# Patient Record
Sex: FEMALE | Race: AMERICAN INDIAN OR ALASKA NATIVE | NOT HISPANIC OR LATINO | Employment: FULL TIME | URBAN - METROPOLITAN AREA
[De-identification: names, ages, dates, MRNs, and addresses within clinical notes are randomized per-mention and may not be internally consistent; named-entity substitution may affect disease eponyms.]

---

## 2020-10-08 ENCOUNTER — TELEPHONE (OUTPATIENT)
Dept: TRANSPLANT | Facility: CLINIC | Age: 30
End: 2020-10-08

## 2020-10-08 NOTE — TELEPHONE ENCOUNTER
"Close Window   Print This Page   Expand All  Collapse All  [facility] BREEZE  [id number]      LIVING KIDNEY DONOR EVALUATION  Donor First Name Mahi Donor MRN    Donor Last Name Valente Completed 10/5/2020 3:38 PM    1990 Record ID [id number]   BREEZE Screen PASSED     Intended Recipient  Recipient First Name Lasha Recipient MRN    Recipient Last Name Johnny Relationship Spouse   Recipient  [id number] Recipient Diagnosis    Recipient's ABO      Donor Information  Age 30 Gender Female   Ht 163 cm (5' 4'') Race  or    Wt 85.7 kg (189 lbs) Ethnicity Not /   BMI 32.40 kg/m  Preferred Language English      Required No     Blood Type Unknown   Demographics  Home Address [address] [place] [address] # 2 [phone]   [facility] Type Mobile   State [place] Alternate #    Zip Code [address] Type Mobile   Country [place] Preferred Contact day Mon, Fri, Thur, Wed, Tue   Email [email or web address] Preferred Contact time 11:00 AM-1:00 PM, 1:00 PM-4:00 PM, 09:00 AM-11:00 AM   &&   Donor's Medical Information  Medical History None Reported Medications None Reported   Surgical History Tubal Ligation Allergies NKDA   Social History EtOH: Rare (1-2 drinks/month)   Illicit Drug Use: Denies   Tobacco: Denies Self-Reported Functional Status \"I am able to participate in strenuous sports such as swimming, singles tennis, football, basketball, or skiing\"   Family Medical History Cancer (denies)   Diabetes (denies)   Heart Disease (denies)   Hypertension (denies)   Kidney Disease (denies)   Kidney Stones (denies) Exercise Frequency Exercise (>3 times per week)   Review of Organ Systems  Review of Systems Airway or Lungs: No   Blood Disorder: No   Cancer: No   Diabetes,Thyroid,Adrenal,Endocrine Disorder: No   Digestive or Liver: No   Female Health: No   Heart or Circulatory System: No   Immune Diseases: No   Kidneys and Bladder: No   Muscles,Bones,Joints: No   Neuro: No   Psych: No "   &&   Donor's Social Information  Marital Status  Living Accommodation Owns own home/apartment   Level of Education High school or secondary school degree complete Living Arrangement With spouse   Employment Status Full Time Concerns: health and life insurance No   Employer [employer] Concerns: job security and lost income No   Occupation      Medical Insurance Status Has medical insurance     High Risk Behavior  High Risk Behaviors Blood transfusion < 12 months. (NO)   Commercial sex < 12 months. (NO)   Illicit IV drug use < 5yrs. (NO)   Other high risk sexual contact < 12 months. (NO)   Reason for Donation  Referral Tx Candidate Reason for Donation My  has stage 4 kidney disease   Permission to Disclose Inquiry Yes Patient Comments    Donor Motivation Level Highly motivated donor     PCP Contact  PCP Name [facility]   PCP [facility]   PCP Phone [phone]   Emergency Contact  First Name Louise First Name Baylee   Last Name Victor Last Name Victor   Phone # [phone] Phone # [phone]   Phone Type Mobile Phone Type Mobile   Relationship Sister Relationship Mother   Office Use  Reviewed By    Reviewed 10/8/2020 4:13 PM   Admin Folder Archive   Comments    Lost for Followup    Extended Comments    BREEZE ID [email or web address]:[id number] survey status completed   Activity History  Call  Due Date 10/8/2020   Last Modified Date/Time 10/8/2020 9:43 AM   Comments

## 2020-10-09 DIAGNOSIS — Z00.5 TRANSPLANT DONOR EVALUATION: Primary | ICD-10-CM

## 2020-10-21 ENCOUNTER — ALLIED HEALTH/NURSE VISIT (OUTPATIENT)
Dept: TRANSPLANT | Facility: CLINIC | Age: 30
End: 2020-10-21
Attending: INTERNAL MEDICINE

## 2020-10-21 VITALS
OXYGEN SATURATION: 97 % | HEIGHT: 64 IN | DIASTOLIC BLOOD PRESSURE: 81 MMHG | SYSTOLIC BLOOD PRESSURE: 117 MMHG | HEART RATE: 71 BPM | WEIGHT: 191.7 LBS | BODY MASS INDEX: 32.73 KG/M2

## 2020-10-21 DIAGNOSIS — Z00.5 TRANSPLANT DONOR EVALUATION: Primary | ICD-10-CM

## 2020-10-21 DIAGNOSIS — Z00.5 TRANSPLANT DONOR EVALUATION: ICD-10-CM

## 2020-10-21 LAB
ABO + RH BLD: NORMAL
ABO + RH BLD: NORMAL
ALBUMIN UR-MCNC: NEGATIVE MG/DL
APPEARANCE UR: ABNORMAL
BACTERIA #/AREA URNS HPF: ABNORMAL /HPF
BILIRUB UR QL STRIP: NEGATIVE
COLOR UR AUTO: YELLOW
CREAT SERPL-MCNC: 0.7 MG/DL (ref 0.52–1.04)
CREAT UR-MCNC: 167 MG/DL
GFR SERPL CREATININE-BSD FRML MDRD: >90 ML/MIN/{1.73_M2}
GLUCOSE SERPL-MCNC: 96 MG/DL (ref 70–99)
GLUCOSE UR STRIP-MCNC: NEGATIVE MG/DL
HGB BLD-MCNC: 13.3 G/DL (ref 11.7–15.7)
HGB UR QL STRIP: NEGATIVE
KETONES UR STRIP-MCNC: NEGATIVE MG/DL
LEUKOCYTE ESTERASE UR QL STRIP: NEGATIVE
MICROALBUMIN UR-MCNC: 18 MG/L
MICROALBUMIN/CREAT UR: 11.02 MG/G CR (ref 0–25)
MUCOUS THREADS #/AREA URNS LPF: PRESENT /LPF
NITRATE UR QL: NEGATIVE
PH UR STRIP: 5 PH (ref 5–7)
PROT UR-MCNC: 0.12 G/L
PROT/CREAT 24H UR: 0.07 G/G CR (ref 0–0.2)
RBC #/AREA URNS AUTO: 1 /HPF (ref 0–2)
SOURCE: ABNORMAL
SP GR UR STRIP: 1.02 (ref 1–1.03)
SPECIMEN EXP DATE BLD: NORMAL
SQUAMOUS #/AREA URNS AUTO: 10 /HPF (ref 0–1)
UROBILINOGEN UR STRIP-MCNC: 0 MG/DL (ref 0–2)
WBC #/AREA URNS AUTO: 3 /HPF (ref 0–5)

## 2020-10-21 PROCEDURE — 36415 COLL VENOUS BLD VENIPUNCTURE: CPT | Performed by: PATHOLOGY

## 2020-10-21 PROCEDURE — 86900 BLOOD TYPING SEROLOGIC ABO: CPT | Performed by: PATHOLOGY

## 2020-10-21 PROCEDURE — 81001 URINALYSIS AUTO W/SCOPE: CPT | Performed by: PATHOLOGY

## 2020-10-21 PROCEDURE — 82043 UR ALBUMIN QUANTITATIVE: CPT | Performed by: PATHOLOGY

## 2020-10-21 PROCEDURE — 82947 ASSAY GLUCOSE BLOOD QUANT: CPT | Performed by: PATHOLOGY

## 2020-10-21 PROCEDURE — 84156 ASSAY OF PROTEIN URINE: CPT | Performed by: PATHOLOGY

## 2020-10-21 PROCEDURE — 82565 ASSAY OF CREATININE: CPT | Performed by: PATHOLOGY

## 2020-10-21 PROCEDURE — 85018 HEMOGLOBIN: CPT | Performed by: PATHOLOGY

## 2020-10-21 PROCEDURE — 99207 PR NO CHARGE NURSE ONLY: CPT

## 2020-10-21 ASSESSMENT — PAIN SCALES - GENERAL: PAINLEVEL: NO PAIN (0)

## 2020-10-21 ASSESSMENT — MIFFLIN-ST. JEOR: SCORE: 1570.42

## 2020-10-21 NOTE — NURSING NOTE
"Chief Complaint   Patient presents with     Allied Health Visit     3 BP's     Vital signs:      BP: 117/81 Pulse: 71     SpO2: 97 %     Height: 163.5 cm (5' 4.37\") Weight: 87 kg (191 lb 11.2 oz)  Estimated body mass index is 32.53 kg/m  as calculated from the following:    Height as of this encounter: 1.635 m (5' 4.37\").    Weight as of this encounter: 87 kg (191 lb 11.2 oz).        Natalee Infante, NANCI    "

## 2020-10-30 ENCOUNTER — TELEPHONE (OUTPATIENT)
Dept: TRANSPLANT | Facility: CLINIC | Age: 30
End: 2020-10-30

## 2020-10-30 NOTE — TELEPHONE ENCOUNTER
Informed Mahi of abo B--she doesn't know recip's abo and he is sched for labs/appts here in Nov so we will need to wait with further testing.Understood.Verified interest in PEP also.Average UDD=087

## 2020-12-06 ENCOUNTER — HEALTH MAINTENANCE LETTER (OUTPATIENT)
Age: 30
End: 2020-12-06

## 2021-08-05 ENCOUNTER — TELEPHONE (OUTPATIENT)
Dept: TRANSPLANT | Facility: CLINIC | Age: 31
End: 2021-08-05

## 2021-08-05 NOTE — TELEPHONE ENCOUNTER
Received word from recip coordinator that we can cont process with live donors. Mahi wants to cont. Is sched for GER[place]IA call on 8/11/21 at 1000.

## 2021-08-11 ENCOUNTER — TELEPHONE (OUTPATIENT)
Dept: TRANSPLANT | Facility: CLINIC | Age: 31
End: 2021-08-11

## 2021-08-12 ENCOUNTER — DOCUMENTATION ONLY (OUTPATIENT)
Dept: TRANSPLANT | Facility: CLINIC | Age: 31
End: 2021-08-12

## 2021-08-16 ENCOUNTER — TELEPHONE (OUTPATIENT)
Dept: TRANSPLANT | Facility: CLINIC | Age: 31
End: 2021-08-16

## 2021-08-16 DIAGNOSIS — Z00.5 TRANSPLANT DONOR EVALUATION: Primary | ICD-10-CM

## 2021-08-16 RX ORDER — NALOXONE HYDROCHLORIDE 0.4 MG/ML
0.2 INJECTION, SOLUTION INTRAMUSCULAR; INTRAVENOUS; SUBCUTANEOUS
Status: CANCELLED | OUTPATIENT
Start: 2021-08-26

## 2021-08-16 RX ORDER — MEPERIDINE HYDROCHLORIDE 25 MG/ML
25 INJECTION INTRAMUSCULAR; INTRAVENOUS; SUBCUTANEOUS EVERY 30 MIN PRN
Status: CANCELLED | OUTPATIENT
Start: 2021-08-26

## 2021-08-16 RX ORDER — DIPHENHYDRAMINE HYDROCHLORIDE 50 MG/ML
50 INJECTION INTRAMUSCULAR; INTRAVENOUS
Status: CANCELLED
Start: 2021-08-26

## 2021-08-16 RX ORDER — ALBUTEROL SULFATE 90 UG/1
1-2 AEROSOL, METERED RESPIRATORY (INHALATION)
Status: CANCELLED
Start: 2021-08-26

## 2021-08-16 RX ORDER — METHYLPREDNISOLONE SODIUM SUCCINATE 125 MG/2ML
125 INJECTION, POWDER, LYOPHILIZED, FOR SOLUTION INTRAMUSCULAR; INTRAVENOUS
Status: CANCELLED
Start: 2021-08-26

## 2021-08-16 RX ORDER — EPINEPHRINE 1 MG/ML
0.3 INJECTION, SOLUTION, CONCENTRATE INTRAVENOUS EVERY 5 MIN PRN
Status: CANCELLED | OUTPATIENT
Start: 2021-08-26

## 2021-08-16 RX ORDER — ALBUTEROL SULFATE 0.83 MG/ML
2.5 SOLUTION RESPIRATORY (INHALATION)
Status: CANCELLED | OUTPATIENT
Start: 2021-08-26

## 2021-08-16 NOTE — TELEPHONE ENCOUNTER
Please sched kidney donor eval for 8/26/21 slot 2.Also schedule Iohexol and labs at [facility] lab same day.COVID test will be at outside lab. Has MyChart.I will put in orders.

## 2021-08-16 NOTE — TELEPHONE ENCOUNTER
Contacted Mahi Victor to introduce myself and my role, review of medical/surgical/family history and next steps.     Mahi Victor  is aware She can stop donor evaluation at any time.     Mahi Victor is a 31 year old female  ABO B that would like to learn more about donation to her significant other.     Concerns from medical/surgical/family history: family history of HTN, she had phase 1 testing in October 2020 and has lost about 30lbs since October.    Reviewed preliminary lab tests.     Reviewed evaluation testing: Covid PCR, Iohexol, Lab work, CXR, EKG, Provider visits and functions, CT Angiogram.     Reviewed operations of selection committee and angio review meetings and the need for multidisciplinary input.     Reviewed [facility] listing and transfer of care to [facility] team if approved. Provided Mahi with [facility] website to review.     Briefly went over options if approved of NDD, SVP and FVP.     Mahi would like to proceed to evaluation on 8/26/21 if possible with Iohexol on 8/26/21 and COVID PCR prior to Iohexol.     Confirmed that Mahi reviewed Informed consent document and all questions answered.  Reviewed that they will receive Docusign to obtain electronic signature for the following: Informed consent, SRTR data, MATTHEW for medical information, Auth for Electronic communication and will need their signed consent back before proceeding with evaluation.      Encouraged sign up for MyChart and confirmed [facility] sign up.    Verified recipient status if not NDD.    Donor timeline: TBD     Will send orders to scheduling team to set up for evaluation testing.

## 2021-08-19 DIAGNOSIS — Z00.5 TRANSPLANT DONOR EVALUATION: Primary | ICD-10-CM

## 2021-08-25 ENCOUNTER — TELEPHONE (OUTPATIENT)
Dept: TRANSPLANT | Facility: CLINIC | Age: 31
End: 2021-08-25

## 2021-08-25 NOTE — TELEPHONE ENCOUNTER
LM for Mahi to call when she got time.  Wanted to make sure she got her covid swab done before her appt tomorrow.

## 2021-08-26 ENCOUNTER — ANCILLARY PROCEDURE (OUTPATIENT)
Dept: GENERAL RADIOLOGY | Facility: CLINIC | Age: 31
End: 2021-08-26
Attending: INTERNAL MEDICINE

## 2021-08-26 ENCOUNTER — OFFICE VISIT (OUTPATIENT)
Dept: TRANSPLANT | Facility: CLINIC | Age: 31
End: 2021-08-26
Attending: TRANSPLANT SURGERY

## 2021-08-26 ENCOUNTER — LAB (OUTPATIENT)
Dept: LAB | Facility: CLINIC | Age: 31
End: 2021-08-26
Attending: INTERNAL MEDICINE

## 2021-08-26 ENCOUNTER — ALLIED HEALTH/NURSE VISIT (OUTPATIENT)
Dept: TRANSPLANT | Facility: CLINIC | Age: 31
End: 2021-08-26
Attending: TRANSPLANT SURGERY

## 2021-08-26 ENCOUNTER — OFFICE VISIT (OUTPATIENT)
Dept: NEPHROLOGY | Facility: CLINIC | Age: 31
End: 2021-08-26
Attending: TRANSPLANT SURGERY

## 2021-08-26 ENCOUNTER — ANCILLARY PROCEDURE (OUTPATIENT)
Dept: CT IMAGING | Facility: CLINIC | Age: 31
End: 2021-08-26
Attending: INTERNAL MEDICINE

## 2021-08-26 ENCOUNTER — APPOINTMENT (OUTPATIENT)
Dept: LAB | Facility: CLINIC | Age: 31
End: 2021-08-26
Attending: INTERNAL MEDICINE

## 2021-08-26 ENCOUNTER — OFFICE VISIT (OUTPATIENT)
Dept: INFUSION THERAPY | Facility: CLINIC | Age: 31
End: 2021-08-26
Attending: INTERNAL MEDICINE

## 2021-08-26 VITALS
WEIGHT: 162.3 LBS | SYSTOLIC BLOOD PRESSURE: 105 MMHG | HEART RATE: 64 BPM | DIASTOLIC BLOOD PRESSURE: 75 MMHG | SYSTOLIC BLOOD PRESSURE: 108 MMHG | DIASTOLIC BLOOD PRESSURE: 75 MMHG | BODY MASS INDEX: 27.04 KG/M2 | HEIGHT: 65 IN | OXYGEN SATURATION: 100 %

## 2021-08-26 VITALS
HEART RATE: 71 BPM | DIASTOLIC BLOOD PRESSURE: 67 MMHG | BODY MASS INDEX: 27.04 KG/M2 | OXYGEN SATURATION: 98 % | WEIGHT: 162.3 LBS | SYSTOLIC BLOOD PRESSURE: 102 MMHG | HEIGHT: 65 IN | TEMPERATURE: 98.4 F | RESPIRATION RATE: 17 BRPM

## 2021-08-26 DIAGNOSIS — Z00.5 TRANSPLANT DONOR EVALUATION: ICD-10-CM

## 2021-08-26 DIAGNOSIS — Z00.5 TRANSPLANT DONOR EVALUATION: Primary | ICD-10-CM

## 2021-08-26 LAB
ABO/RH(D): NORMAL
ABO/RH(D): NORMAL
ALBUMIN SERPL-MCNC: 3.7 G/DL (ref 3.4–5)
ALBUMIN UR-MCNC: NEGATIVE MG/DL
ALP SERPL-CCNC: 52 U/L (ref 40–150)
ALT SERPL W P-5'-P-CCNC: 27 U/L (ref 0–50)
ANION GAP SERPL CALCULATED.3IONS-SCNC: 7 MMOL/L (ref 3–14)
ANTIBODY SCREEN: NEGATIVE
APPEARANCE UR: CLEAR
APTT PPP: 27 SECONDS (ref 22–38)
AST SERPL W P-5'-P-CCNC: 14 U/L (ref 0–45)
B-HCG SERPL-ACNC: <1 IU/L (ref 0–5)
BACTERIA #/AREA URNS HPF: ABNORMAL /HPF
BILIRUB DIRECT SERPL-MCNC: 0.1 MG/DL (ref 0–0.2)
BILIRUB SERPL-MCNC: 0.5 MG/DL (ref 0.2–1.3)
BILIRUB UR QL STRIP: NEGATIVE
BUN SERPL-MCNC: 18 MG/DL (ref 7–30)
CALCIUM SERPL-MCNC: 9 MG/DL (ref 8.5–10.1)
CHLORIDE BLD-SCNC: 109 MMOL/L (ref 94–109)
CHOLEST SERPL-MCNC: 242 MG/DL
CMV IGG SERPL IA-ACNC: 1.2 U/ML
CMV IGG SERPL IA-ACNC: ABNORMAL
CO2 SERPL-SCNC: 26 MMOL/L (ref 20–32)
COLOR UR AUTO: YELLOW
CREAT SERPL-MCNC: 0.64 MG/DL (ref 0.52–1.04)
CREAT UR-MCNC: 127 MG/DL
CREAT UR-MCNC: 127 MG/DL
EBV VCA IGG SER IA-ACNC: <10 U/ML
EBV VCA IGG SER IA-ACNC: NORMAL
EBV VCA IGM SER IA-ACNC: <10 U/ML
EBV VCA IGM SER IA-ACNC: NORMAL
ERYTHROCYTE [DISTWIDTH] IN BLOOD BY AUTOMATED COUNT: 11.9 % (ref 10–15)
FASTING STATUS PATIENT QL REPORTED: YES
GFR SERPL CREATININE-BSD FRML MDRD: >90 ML/MIN/1.73M2
GLUCOSE BLD-MCNC: 101 MG/DL (ref 70–99)
GLUCOSE UR STRIP-MCNC: NEGATIVE MG/DL
HBA1C MFR BLD: 5 % (ref 0–5.6)
HBV CORE AB SERPL QL IA: NONREACTIVE
HBV SURFACE AB SERPL IA-ACNC: 3.87 M[IU]/ML
HBV SURFACE AG SERPL QL IA: NONREACTIVE
HCT VFR BLD AUTO: 38.3 % (ref 35–47)
HCV AB SERPL QL IA: NONREACTIVE
HDLC SERPL-MCNC: 59 MG/DL
HGB BLD-MCNC: 13.1 G/DL (ref 11.7–15.7)
HGB UR QL STRIP: NEGATIVE
HIV 1+2 AB+HIV1 P24 AG SERPL QL IA: NONREACTIVE
INR PPP: 1.01 (ref 0.85–1.15)
KETONES UR STRIP-MCNC: NEGATIVE MG/DL
LDLC SERPL CALC-MCNC: 171 MG/DL
LEUKOCYTE ESTERASE UR QL STRIP: ABNORMAL
MCH RBC QN AUTO: 30.9 PG (ref 26.5–33)
MCHC RBC AUTO-ENTMCNC: 34.2 G/DL (ref 31.5–36.5)
MCV RBC AUTO: 90 FL (ref 78–100)
MICROALBUMIN UR-MCNC: 11 MG/L
MICROALBUMIN/CREAT UR: 8.66 MG/G CR (ref 0–25)
NITRATE UR QL: POSITIVE
NONHDLC SERPL-MCNC: 183 MG/DL
PH UR STRIP: 5 [PH] (ref 5–7)
PHOSPHATE SERPL-MCNC: 3.6 MG/DL (ref 2.5–4.5)
PLATELET # BLD AUTO: 276 10E3/UL (ref 150–450)
POTASSIUM BLD-SCNC: 4 MMOL/L (ref 3.4–5.3)
PROT SERPL-MCNC: 7 G/DL (ref 6.8–8.8)
PROT UR-MCNC: 0.1 G/L
PROT/CREAT 24H UR: 0.08 G/G CR (ref 0–0.2)
RBC # BLD AUTO: 4.24 10E6/UL (ref 3.8–5.2)
RBC URINE: <1 /HPF
SODIUM SERPL-SCNC: 142 MMOL/L (ref 133–144)
SP GR UR STRIP: 1.02 (ref 1–1.03)
SPECIMEN EXPIRATION DATE: NORMAL
SPECIMEN EXPIRATION DATE: NORMAL
SQUAMOUS EPITHELIAL: 2 /HPF
T PALLIDUM AB SER QL: NONREACTIVE
TRIGL SERPL-MCNC: 59 MG/DL
URATE SERPL-MCNC: 4.8 MG/DL (ref 2.6–6)
UROBILINOGEN UR STRIP-MCNC: NORMAL MG/DL
WBC # BLD AUTO: 4.2 10E3/UL (ref 4–11)
WBC URINE: 12 /HPF

## 2021-08-26 PROCEDURE — 250N000011 HC RX IP 250 OP 636: Performed by: INTERNAL MEDICINE

## 2021-08-26 PROCEDURE — 87340 HEPATITIS B SURFACE AG IA: CPT

## 2021-08-26 PROCEDURE — 86481 TB AG RESPONSE T-CELL SUSP: CPT

## 2021-08-26 PROCEDURE — 99205 OFFICE O/P NEW HI 60 MIN: CPT

## 2021-08-26 PROCEDURE — 74175 CTA ABDOMEN W/CONTRAST: CPT | Mod: GC | Performed by: RADIOLOGY

## 2021-08-26 PROCEDURE — 86900 BLOOD TYPING SEROLOGIC ABO: CPT

## 2021-08-26 PROCEDURE — 36415 COLL VENOUS BLD VENIPUNCTURE: CPT | Performed by: TRANSPLANT SURGERY

## 2021-08-26 PROCEDURE — 84100 ASSAY OF PHOSPHORUS: CPT

## 2021-08-26 PROCEDURE — 86704 HEP B CORE ANTIBODY TOTAL: CPT

## 2021-08-26 PROCEDURE — 85027 COMPLETE CBC AUTOMATED: CPT

## 2021-08-26 PROCEDURE — 86803 HEPATITIS C AB TEST: CPT

## 2021-08-26 PROCEDURE — 86780 TREPONEMA PALLIDUM: CPT

## 2021-08-26 PROCEDURE — 71046 X-RAY EXAM CHEST 2 VIEWS: CPT | Performed by: RADIOLOGY

## 2021-08-26 PROCEDURE — 83036 HEMOGLOBIN GLYCOSYLATED A1C: CPT

## 2021-08-26 PROCEDURE — 85730 THROMBOPLASTIN TIME PARTIAL: CPT

## 2021-08-26 PROCEDURE — 84702 CHORIONIC GONADOTROPIN TEST: CPT

## 2021-08-26 PROCEDURE — 84550 ASSAY OF BLOOD/URIC ACID: CPT

## 2021-08-26 PROCEDURE — 82542 COL CHROMOTOGRAPHY QUAL/QUAN: CPT | Performed by: TRANSPLANT SURGERY

## 2021-08-26 PROCEDURE — 86665 EPSTEIN-BARR CAPSID VCA: CPT

## 2021-08-26 PROCEDURE — 82043 UR ALBUMIN QUANTITATIVE: CPT

## 2021-08-26 PROCEDURE — 36415 COLL VENOUS BLD VENIPUNCTURE: CPT

## 2021-08-26 PROCEDURE — 81001 URINALYSIS AUTO W/SCOPE: CPT

## 2021-08-26 PROCEDURE — 86706 HEP B SURFACE ANTIBODY: CPT

## 2021-08-26 PROCEDURE — 86644 CMV ANTIBODY: CPT

## 2021-08-26 PROCEDURE — 81378 HLA I & II TYPING HR: CPT

## 2021-08-26 PROCEDURE — 80076 HEPATIC FUNCTION PANEL: CPT

## 2021-08-26 PROCEDURE — G0463 HOSPITAL OUTPT CLINIC VISIT: HCPCS | Mod: 25,27

## 2021-08-26 PROCEDURE — 80061 LIPID PANEL: CPT

## 2021-08-26 PROCEDURE — G0463 HOSPITAL OUTPT CLINIC VISIT: HCPCS | Mod: 25

## 2021-08-26 PROCEDURE — 99203 OFFICE O/P NEW LOW 30 MIN: CPT | Performed by: TRANSPLANT SURGERY

## 2021-08-26 PROCEDURE — 84156 ASSAY OF PROTEIN URINE: CPT

## 2021-08-26 PROCEDURE — 96374 THER/PROPH/DIAG INJ IV PUSH: CPT

## 2021-08-26 PROCEDURE — 85610 PROTHROMBIN TIME: CPT

## 2021-08-26 PROCEDURE — 82248 BILIRUBIN DIRECT: CPT

## 2021-08-26 RX ORDER — DIPHENHYDRAMINE HYDROCHLORIDE 50 MG/ML
50 INJECTION INTRAMUSCULAR; INTRAVENOUS
Status: CANCELLED
Start: 2021-08-26

## 2021-08-26 RX ORDER — MEPERIDINE HYDROCHLORIDE 25 MG/ML
25 INJECTION INTRAMUSCULAR; INTRAVENOUS; SUBCUTANEOUS EVERY 30 MIN PRN
Status: CANCELLED | OUTPATIENT
Start: 2021-08-26

## 2021-08-26 RX ORDER — ALBUTEROL SULFATE 90 UG/1
1-2 AEROSOL, METERED RESPIRATORY (INHALATION)
Status: CANCELLED
Start: 2021-08-26

## 2021-08-26 RX ORDER — IOPAMIDOL 755 MG/ML
100 INJECTION, SOLUTION INTRAVASCULAR ONCE
Status: COMPLETED | OUTPATIENT
Start: 2021-08-26 | End: 2021-08-26

## 2021-08-26 RX ORDER — METHYLPREDNISOLONE SODIUM SUCCINATE 125 MG/2ML
125 INJECTION, POWDER, LYOPHILIZED, FOR SOLUTION INTRAMUSCULAR; INTRAVENOUS
Status: CANCELLED
Start: 2021-08-26

## 2021-08-26 RX ORDER — ALBUTEROL SULFATE 0.83 MG/ML
2.5 SOLUTION RESPIRATORY (INHALATION)
Status: CANCELLED | OUTPATIENT
Start: 2021-08-26

## 2021-08-26 RX ORDER — NALOXONE HYDROCHLORIDE 0.4 MG/ML
0.2 INJECTION, SOLUTION INTRAMUSCULAR; INTRAVENOUS; SUBCUTANEOUS
Status: CANCELLED | OUTPATIENT
Start: 2021-08-26

## 2021-08-26 RX ORDER — EPINEPHRINE 1 MG/ML
0.3 INJECTION, SOLUTION INTRAMUSCULAR; SUBCUTANEOUS EVERY 5 MIN PRN
Status: CANCELLED | OUTPATIENT
Start: 2021-08-26

## 2021-08-26 RX ADMIN — IOHEXOL 5 ML: 300 INJECTION, SOLUTION INTRAVENOUS at 08:17

## 2021-08-26 RX ADMIN — IOPAMIDOL 100 ML: 755 INJECTION, SOLUTION INTRAVASCULAR at 13:42

## 2021-08-26 ASSESSMENT — MIFFLIN-ST. JEOR
SCORE: 1442.07
SCORE: 1434.13

## 2021-08-26 ASSESSMENT — PAIN SCALES - GENERAL: PAINLEVEL: NO PAIN (0)

## 2021-08-26 NOTE — NURSING NOTE
Chief Complaint   Patient presents with     Iohexal test     Blood Draw     IV placed, blood drawn, vitals taken, checked into next appointment.     Labs drawn via IV placed by Alexandra Hernandez MA   in left arm.    Alexandra Hernandez MA

## 2021-08-26 NOTE — NURSING NOTE
"Chief Complaint   Patient presents with     Consult     Donor Eval     Blood pressure 110/71, pulse 64, height 1.638 m (5' 4.5\"), weight 73.6 kg (162 lb 4.8 oz), SpO2 100 %.    Linnea Guerrier on 8/26/2021 at 8:35 AM    "

## 2021-08-26 NOTE — PROGRESS NOTES
Psychosocial Evaluation  Living Organ Donation per OPTN Policy 14.1.A  Organ Type: unrelated, kidney  Presenting Information:  Ms. Mahi Victor presents to the [facility] to complete a psychosocial evaluation since she is interested in becoming a kidney donor for her , Mr. Lasha Turner, age 34.    PERSONAL BACKGROUND:  Current Living Situation: Ms. Victor lives in a single family home with her  and their children in [place], [place]     Education/Employment/Financial Situation: Ms. Victor works at [employer] full time.  She has worked ther for 17 years.  She did not finish high school and did not get a GED.  She dropped out of high school after the 10th grade.  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  Her hours work for her and she is happy there.  Ms. Victor has short term disability through her job.  Her  works full time at [employer].  Ms. Victor will use her paid sick leave and short term disability insurance post kidney donation.  We also discussed the donor shield benefits through [facility].    Health Insurance Status: Ms. Victor has health care insurance through the Medical Assistance Program through the [facility].      Family History: Ms. Victor is .  She and her  have a 15 year old son, and three daughters, ages 11, 7, and 6 years old.  She and her  have been together since they were 14 years old.  They have been legally  since 2013.    Her parents are still living.  She has six siblings.      General Health: Ms. Victor does not have a health care directive or living will.  She does not have a specific primary care doctor, but she does use [facility] [place] in [place], [place] for her and her family's routine medical needs.    Mental Health: Denies any past or present treatment for mental health issues.  Denies any need to see a counselor or therapist.  Denies any past suicidal ideation, plans, or past attempts.  Denies  any use of psychotropic medications.  Denies any past history of hospitalization for psychiatric illness.    Alcohol and Drug Use/Abuse/Dependency: Ms. Victor might use alcohol one time per month, 2 to 3 servings.  Denies any past history of abuse or dependency on alcohol or illicit drugs. Denies any current use of street drugs, including marijuana.  Denies any past history of negative consequences of use of alcohol or drugs such as a DUI, relationship problems, or problems with work or home life.       Cigarette Use: no smoking    Legal: no legal issues    Coping with major surgery/associated stress: Ms. Victor reports that talking to her sister and a few good friends are her main ways to cope with stress. She likes to rollerblade and walk with her children.    Support System: Ms. Victor's main caregiver post surgery would be her sister.    DONOR SPECIFIC INFORMATION:  Relationship to Recipient: Ms. Victor wants to give a kidney to her , Mr. Lasha Turner, age 34/  He used drugs for a long time.  He has been clean for over a year.  He now needs a transplant.    Decision Process/Motivation to Donate: Ms. Victor tells me that not only her 's life will be improved through kidney donation, but her life and the lives of her children will greatly improve if he is able to receive her kidney or another matched kidney through paired exchange.  She denies feeling any pressure or coercion to be a donor.  She denies being offered any form of payment to be a donor.    PREPARATION FOR DONATION, RECOVERY, AND POTENTIAL SHORT-LONG-TERM OUTCOMES:  Understanding of the Living Donation Process:   We discussed the role of the donor  and Independent Donor Advocate.  Short and long term medical and psychosocial risks to both, donor and recipient were reviewed and she voices her understanding of these risks.  High risk behaviors as defined by [place] Public Health Services (PHS) that have potential to increase risk of  disease transmission were reviewed and she denies any high risk behaviors. Post surgical restrictions (2 weeks no driving, 6 weeks no lifting over 10 lbs) were reviewed and she appears capable of adhering to the post surgical requirements. The need for a caregiver was discussed and she will have her sister to be her caregiver post surger .  The risk of poor psychosocial outcome including problems with body image, post-surgery depression or anxiety, or feelings of emotional distress or bereavement if recipient experiences any recurrent disease, poor outcome or death was reviewed.  Additionally, potential financial implications, including the risk of having difficulty obtaining health care insurance, life insurance, disability insurance, or long term care insurance were reviewed, as were available donor grants to assist with donor related expenses.      We also discussed some unique issues that arise with paired kidney donation, which include the uncertainty of the timing and the importance of having a employment situation and support system that is able to provide sustained support and flexibility.    Ms. Victor appears capable of understanding this information and making an informed medical decision.    Impressions/Recommendations:   Ms. Mahi Victor  is highly motivated to donate a kidney  to her , Mr. Lasha Turner, age 34.  Her decision to donate is free of inducement, coercion, or other undue pressure.   Her housing, finances and employment are stable.  No current/active mental health or chemical abuse issues were identified.  The need for a caregiver was reviewed and she is able to identify a plan to meet her post operative care needs.  She appears capable of making an informed medical decision.  No psychosocial contraindications to living organ donation were identified and  I support Ms. Victor s desire to donate a kidney to her , Mr. Lasha Turner, age 34.         Contact Information:   Shantell  HONORIO Roque, [facility]  Clinical  and Independent Donor Advocate  [facility]  Pager:  [phone]  Direct:  [phone]    Time Spent: 60 minutes      Living Kidney Donor Consent per OPTN Policy 14.3.A for Independent Living Donor Advocate (GER[place]IA)    Written assurance has been obtained from the potential donor that he/she:   Is willing to donate  Is free from inducement and coercion  Has been informed that the he/she may decline to donate at any time  Has been informed that transplant centers must:   A) Offer donors an opportunity to discontinue the donor consent or evaluation process in a way that is protected and confidential  B) Provide an independent living donor advocate (GER[place]IA) to assist the potential donor during this process    The following was presented to the potential donor in a language in which the potential donor is able to engage in meaningful dialogue:   Education and instruction about all phases of the living donation process including:   Consent  Medical and psychosocial evaluation  Information about the surgical procedure  Pre and post operative care  Benefits of post operative follow up  Disclosure that the recovery hospital will take all reasonable precautions to provide confidentiality for the donor/recipient  Disclosure that it is a federal crime for any person to knowingly acquire, obtain or otherwise transfer any human organ for valuable consideration  Disclosure that the [facility] hospital must provide an independent living donor advocate ([place])  Disclosure that health information obtained during the evaluation is subject to the same regulations as all records and could reveal conditions that must be reported to local, state, or federal public health authorities  Disclosure that the [facility] hospital is required to report living donor follow up information at 6 months, 1 year, and 2 years, and that the potential donor must commit to post  operative follow up testing coordinated by the [facility]    Disclosure has been provided that these risks may be transient or permanent & include but are not limited to:  Potential psychosocial risks:  Problems with body image  Post-surgery depression or anxiety  Feelings of emotional distress or bereavement if recipient experiences any recurrent disease or in the event of the recipient s death  Impact of donation on the donor s lifestyle    Potential financial impacts:  Personal expenses of travel, housing, , lost wages related to donation might not be reimbursed. However, resources may be available to defray some donation-related expenses   Need for life-long follow up at the donor s expense  Loss of employment or income  Negative impact on the ability to obtain future employment  Negative impact on the ability to obtain, maintain, or afford health, disability, and life insurance  Future health problems experienced by living donors following donation may not be covered by the recipient s insurance    Contact Information:  HONORIO Zhang, [facility]  Clinical  and Independent Donor Advocate  [facility]  Pager:  [phone]  Direct:  [phone]    Time Spent: 60 minutes

## 2021-08-26 NOTE — PROGRESS NOTES
TRANSPLANT NEPHROLOGY DONOR EVALUATION    Assessment and Plan:  # Prospective Kidney Transplant Donor: Patient with a couple of issues that need to be addressed prior to donation. Patient's blood pressure is acceptable at this visit, kidney function appears to be acceptable with Iohexol pending, and urinalysis is abnormal.    # Pyuria: Patient with probably asymptomatic urinary tract infection, although the urinalysis was slightly contaminated.  Recommend repeating urinalysis in a week or two.    # Dyslipidemia: Elevated total cholesterol and LDL with normal HDL and triglycerides.  Likely genetically driven, but discussed a balanced, healthy diet and continued weight loss.  Patient to follow up with PCP as needed.    # Elevated Fasting Glucose: Minimally elevated fasting glucose, but normal HbA1c.  Recommend health diet and exercise.    Discussed the risks of donating a kidney, including the surgical risk and the possible risks of living with one kidney.    Education about expected post-donation kidney function and how chronic kidney disease (CKD) and end stage kidney disease (ESKD) might potentially impact the donor in the future, include, but not limited to:       - On average, donors will have 25-35% permanent loss of kidney function at donation.       - Baseline risk of ESKD may slightly exceed that of members of the general population with the same demographic profile.       - Donor risks must be interpreted in light of known epidemiology of both CKD or ESKD, such as that CKD generally develops in midlife (40-50 years old) and ESKD generally develops after age 60.       - Medical evaluation of young potential donors cannot predict lifetime risk of CKD or ESKD.       - Donors may be at higher risk for CKD if they sustain damage to the remaining kidney.       - Development of CKD and progression of ESKD may be more rapid with only 1 kidney.       - Some type of kidney replacement therapy, either kidney transplant  or dialysis, is required when reaching ESKD.    Potential medical or surgical risks include, but not limited to:       - Death.       - Scars, pain, fatigue, and other consequences typical of any surgical procedure.       - Decreased kidney function.       - Abdominal or bowel symptoms, such as bloating and nausea, and developing bowel obstruction.       - Kidney failure (ESKD) and the need for a kidney transplant or dialysis for the donor.       - Impact of obesity, hypertension, or other donor-specific medical conditions on morbidity and mortality of the potential donor.    Patients overall evaluation will be discussed with the transplant team and a final recommendation on the patients' suitability to be a kidney transplant donor will be made at that time.    Consult:  Mahi Victor was seen in consultation at the request of Dr. Kary Heath for evaluation as a potential kidney transplant donor.    Reason for Visit:  Mahi Victor is a 31 year old female who presents for a kidney donor evaluation.  Patient would like to donate to Lasha Turner, her .    HPI:    Energy level is good and has been normal.  She is active and does get some exercise.  Denies any chest pain or shortness of breath with exertion.  Appetite is good.  She has intentionally lost weight, now down about 30 lbs in the last year, eating a keto diet.  Weight is more stable lately.  No nausea, vomiting or diarrhea.  No fever, sweats or chills.  No leg swelling.  No pain or burning with urination.         Kidney Disease Hx:       h/o Kidney Problems: No  Family h/o Genetic Kidney Disease: No       h/o HTN: No    Usual BP: Doesn't know       h/o Protein in Urine: No  h/o Blood in Urine: No       h/o Kidney Stones: No  h/o Kidney Injury: No       h/o Recurrent UTI: No  h/o Genitourinary Problems: No       h/o Chronic NSAID Use: No         Other Medical Hx:       h/o DM: No             h/o Gastrointestinal, Pancreas or Liver Problems: No        h/o Lung or Heart Problems: No       h/o Hematologic Problems: No  h/o Bleeding or Clotting Problems: No       h/o Cancer: No       h/o Infection Problems: No       h/o Gestational DM: No      h/o Preeclampsia: No         Skin Cancer Risk:       h/o more than 50 moles: No       h/o extensive sun exposure: No       h/o melanoma: No       Family h/o melanoma: No         Mental Health Assessment:       h/o Depression: No       h/o Psychiatric Illness: No       h/o Suicidal Attempt(s): No    Review Of Systems:   A comprehensive review of systems was obtained and negative, except as noted in the HPI or PMH.    Past Medical History:   History was taken from the patient as noted below.  Past Medical History:   Diagnosis Date     Cholestasis 01/06/2015       Past Social History:   Past Surgical History:   Procedure Laterality Date     TUBAL LIGATION N/A      Personal or family history of anesthesia problems: No    Family History:   Family History   Problem Relation Age of Onset     Hypertension Mother      Breast Cancer Mother      Hypertension Father      Throat cancer Father      Kidney failure Father      No Known Problems Sister      Pacemaker Sister      No Known Problems Sister      No Known Problems Sister      No Known Problems Brother      Hypertension Maternal Grandmother      Kidney failure Maternal Grandmother      Heart Defect Paternal Grandmother      No Known Problems Son      No Known Problems Daughter      No Known Problems Daughter      No Known Problems Daughter           Specific Family History in First Degree Relatives:       FH of Kidney Dz: No FH of DM: No       FH of HTN: Yes   FH of CAD: No       FH of Cancer: Yes   FH of Kidney Cancer: No    Personal History:   Social History     Socioeconomic History     Marital status:      Spouse name: Not on file     Number of children: 4     Years of education: Not on file     Highest education level: Not on file   Occupational History     Not on  file   Tobacco Use     Smoking status: Former Smoker     Quit date:      Years since quittin.6     Smokeless tobacco: Never Used   Substance and Sexual Activity     Alcohol use: Not Currently     Alcohol/week: 0.0 - 1.0 standard drinks     Drug use: Never     Sexual activity: Not on file   Other Topics Concern     Not on file   Social History Narrative     Not on file     Social Determinants of Health     Financial Resource Strain:      Difficulty of Paying Living Expenses:    Food Insecurity:      Worried About Running Out of Food in the Last Year:      Ran Out of Food in the Last Year:    Transportation Needs:      Lack of Transportation (Medical):      Lack of Transportation (Non-Medical):    Physical Activity:      Days of Exercise per Week:      Minutes of Exercise per Session:    Stress:      Feeling of Stress :    Social Connections:      Frequency of Communication with Friends and Family:      Frequency of Social Gatherings with Friends and Family:      Attends [employer] Services:      Active Member of Clubs or Organizations:      Attends Club or Organization Meetings:      Marital Status:    Intimate Partner Violence:      Fear of Current or Ex-Partner:      Emotionally Abused:      Physically Abused:      Sexually Abused:           Specific Social History:       Health Insurance Status: Yes       Employment Status: Full time  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     No current facility-administered medications for this visit.     Facility-Administered Medications Ordered in Other Visits   Medication     sodium chloride (PF) 0.9% PF flush 5 mL     There are no discontinued medications.      Vitals:  Vital Signs  8/26/2021 8/26/2021 8/26/2021   Systolic 105 105 108   Diastolic 75 70 75   Pulse - - -   Temperature - - -   Respirations - - -   Weight (LB) - - -   Height - - -   BMI (Calculated) - - -   Pain - - -   O2 - - -       Exam:   GENERAL APPEARANCE: alert and no distress  HENT: mouth without ulcers or lesions  LYMPHATICS: no cervical or supraclavicular nodes  RESP: lungs clear to auscultation - no rales, rhonchi or wheezes  CV: regular rhythm, normal rate, no rub, no murmur  EDEMA: no LE edema bilaterally  ABDOMEN: soft, nondistended, nontender, bowel sounds normal  MS: extremities normal - no gross deformities noted, no evidence of inflammation in joints, no muscle tenderness  SKIN: no rash    Results:   Labs and imaging were ordered for this visit and reviewed by me.  Recent Results (from the past 336 hour(s))   Bilirubin direct    Collection Time: 08/26/21  7:50 AM   Result Value Ref Range    Bilirubin Direct 0.1 0.0 - 0.2 mg/dL   Comprehensive metabolic panel    Collection Time: 08/26/21  7:50 AM   Result Value Ref Range    Sodium 142 133 - 144 mmol/L    Potassium 4.0 3.4 - 5.3 mmol/L    Chloride 109 94 - 109 mmol/L    Carbon Dioxide (CO2) 26 20 - 32 mmol/L    Anion Gap 7 3 - 14 mmol/L    Urea Nitrogen 18 7 - 30 mg/dL    Creatinine 0.64 0.52 - 1.04 mg/dL    Calcium 9.0 8.5 - 10.1 mg/dL    Glucose 101 (H) 70 - 99 mg/dL    Alkaline Phosphatase 52 40 - 150 U/L    AST 14 0 - 45 U/L    ALT 27 0 - 50 U/L    Protein Total 7.0 6.8 - 8.8 g/dL    Albumin 3.7 3.4 - 5.0 g/dL    Bilirubin Total 0.5 0.2 - 1.3 mg/dL    GFR Estimate >90 >60 mL/min/1.73m2   Lipid Profile    Collection Time: 08/26/21  7:50 AM   Result Value Ref Range    Cholesterol 242 (H) <200 mg/dL    Triglycerides 59 <150 mg/dL    Direct Measure HDL 59 >=50 mg/dL    LDL Cholesterol Calculated 171 (H) <=100 mg/dL    Non HDL Cholesterol 183 (H) <130 mg/dL    Patient Fasting > 8hrs? Yes    Uric acid    Collection Time: 08/26/21  7:50 AM   Result Value Ref Range     Uric Acid 4.8 2.6 - 6.0 mg/dL   Phosphorus    Collection Time: 08/26/21  7:50 AM   Result Value Ref Range    Phosphorus 3.6 2.5 - 4.5 mg/dL   Hemoglobin A1c    Collection Time: 08/26/21  7:50 AM   Result Value Ref Range    Hemoglobin A1C 5.0 0.0 - 5.6 %   INR    Collection Time: 08/26/21  7:50 AM   Result Value Ref Range    INR 1.01 0.85 - 1.15   Partial thromboplastin time    Collection Time: 08/26/21  7:50 AM   Result Value Ref Range    aPTT 27 22 - 38 Seconds   CBC with platelets    Collection Time: 08/26/21  7:50 AM   Result Value Ref Range    WBC Count 4.2 4.0 - 11.0 10e3/uL    RBC Count 4.24 3.80 - 5.20 10e6/uL    Hemoglobin 13.1 11.7 - 15.7 g/dL    Hematocrit 38.3 35.0 - 47.0 %    MCV 90 78 - 100 fL    MCH 30.9 26.5 - 33.0 pg    MCHC 34.2 31.5 - 36.5 g/dL    RDW 11.9 10.0 - 15.0 %    Platelet Count 276 150 - 450 10e3/uL   Albumin Random Urine Quantitative with Creat Ratio    Collection Time: 08/26/21  7:50 AM   Result Value Ref Range    Creatinine Urine mg/dL 127 mg/dL    Albumin Urine mg/L 11 mg/L    Albumin Urine mg/g Cr 8.66 0.00 - 25.00 mg/g Cr   Protein  random urine with Creat Ratio    Collection Time: 08/26/21  7:50 AM   Result Value Ref Range    Total Protein Random Urine g/L 0.10 g/L    Total Protein Urine g/gr Creatinine 0.08 0.00 - 0.20 g/g Cr    Creatinine Urine mg/dL 127 mg/dL   Routine UA with microscopic    Collection Time: 08/26/21  7:50 AM   Result Value Ref Range    Color Urine Yellow Colorless, Straw, Light Yellow, Yellow    Appearance Urine Clear Clear    Glucose Urine Negative Negative mg/dL    Bilirubin Urine Negative Negative    Ketones Urine Negative Negative mg/dL    Specific Gravity Urine 1.020 1.003 - 1.035    Blood Urine Negative Negative    pH Urine 5.0 5.0 - 7.0    Protein Albumin Urine Negative Negative mg/dL    Urobilinogen Urine Normal Normal, 2.0 mg/dL    Nitrite Urine Positive (A) Negative    Leukocyte Esterase Urine Trace (A) Negative    Bacteria Urine Many (A) None Seen  /HPF    RBC Urine <1 <=2 /HPF    WBC Urine 12 (H) <=5 /HPF    Squamous Epithelials Urine 2 (H) <=1 /HPF   Hepatitis B core antibody    Collection Time: 08/26/21  7:50 AM   Result Value Ref Range    Hepatitis B Core Antibody Total Nonreactive Nonreactive   Hepatitis B Surface Antibody    Collection Time: 08/26/21  7:50 AM   Result Value Ref Range    Hepatitis B Surface Antibody 3.87 <8.00 m[IU]/mL   Hepatitis B surface antigen    Collection Time: 08/26/21  7:50 AM   Result Value Ref Range    Hepatitis B Surface Antigen Nonreactive Nonreactive   Hepatitis C antibody    Collection Time: 08/26/21  7:50 AM   Result Value Ref Range    Hepatitis C Antibody Nonreactive Nonreactive   HIV Antigen Antibody Combo Pretransplant    Collection Time: 08/26/21  7:50 AM   Result Value Ref Range    HIV Antigen Antibody Combo Pretransplant Nonreactive Nonreactive   Treponema Abs w Reflex to RPR and Titer    Collection Time: 08/26/21  7:50 AM   Result Value Ref Range    Treponema Antibody Total Nonreactive Nonreactive   CMV Antibody IgG    Collection Time: 08/26/21  7:50 AM   Result Value Ref Range    CMV Alejandra IgG Instrument Value 1.20 (H) <0.60 U/mL    CMV Antibody IgG Positive, suggests recent or past exposure. (A) No detectable antibody.    EBV Capsid Antibody IgG    Collection Time: 08/26/21  7:50 AM   Result Value Ref Range    EBV Capsid Alejandra IgG Instrument Value <10.0 <18.0 U/mL    EBV Capsid Antibody IgG No detectable antibody. No detectable antibody.   EBV Capsid Antibody IgM    Collection Time: 08/26/21  7:50 AM   Result Value Ref Range    EBV Capsid Alejandra IgM Instrument Value <10.0 <36.0 U/mL    EBV Capsid Antibody IgM No detectable antibody. No detectable antibody.   HCG quantitative pregnancy    Collection Time: 08/26/21  7:50 AM   Result Value Ref Range    hCG Quantitative <1 0 - 5 IU/L   Adult Type and Screen    Collection Time: 08/26/21  7:50 AM   Result Value Ref Range    ABO/RH(D) B POS     Antibody Screen Negative Negative     SPECIMEN EXPIRATION DATE [id number]    ABO and Rh    Collection Time: 08/26/21  8:07 AM   Result Value Ref Range    ABO/RH(D) B POS     SPECIMEN EXPIRATION DATE [id number]    EKG 12-lead complete w/read - Clinics    Collection Time: 08/26/21  1:18 PM   Result Value Ref Range    Systolic Blood Pressure  mmHg    Diastolic Blood Pressure  mmHg    Ventricular Rate 72 BPM    Atrial Rate 72 BPM    NH Interval 150 ms    QRS Duration 94 ms     ms    QTc 427 ms    P Axis 37 degrees    R AXIS 57 degrees    T Axis 34 degrees    Interpretation ECG       Sinus rhythm with sinus arrhythmia  Normal ECG  No previous ECGs available

## 2021-08-26 NOTE — PROGRESS NOTES
Transplant Surgery Consult Note    Medical record number: [id number]  YOB: 1990,   Consult requested by the patient for evaluation of kidney donation candidacy.    Assessment and Recommendations: Ms. Victor appears to be a good candidate for kidney donation at this point in the evaluation. The following issues will need to be addressed prior to formal review:    Iohexol, CT, labs     The majority of our visit today was spent in counseling regarding the medical and surgical risks of kidney donation, the typical luc-and post-operative experience and recovery/return to work pattern.  We also talked about post-op visits and longer term health care maintenance, as well as the implications of having one remaining kidney. This discussion included, but was not limited to rates of complications such as bleeding, infection, need for transfusion, reoperation, other organ injury, future bowel obstruction, incisional hernia, port site pain, varicocele, venous thrombosis, pulmonary embolism, renal failure, and death (3 per 10,000). The patient understands that if end stage renal failure happens that dialysis or transplant would be required.   At the conclusion of the visit, all questions had been answered and Ms. Victor's candidacy for donation will be reviewed at our Multidisciplinary Donor Selection Committee. She will stay in contact with the nurse coordinator with any concerns.      Total time: 35 minutes  Counselling time: 30 minutes    Dru Kelly MD, PhD  Associate Professor of Surgery      ---------------------------------------------------------------------------------------------------    HPI: Mahi Victor is a 31 year old year old female who presents for a kidney donor evaluation.  Patient would like to donate to her .      Personal history of:   No    Yes  Cancer:    [x]      []             Comment:     Diabetes   [x]      []  Comment:    Gómez   [x]      []  Comment:        Hepatitis   [x]      []  Comment:    Tuberculosis   [x]      []  Comment:   Back or neck pain:  [x]      []  Comment:      Kidney stones   [x]      []  Comment:                  Kidney infections  [x]      []  Comment:           Urinary retention   [x]      []            Comment:   Regular NSAID use:  [x]      []            Comment:      Constipation:   [x]      []            Comment:      Rastafari  [x]      []            Comment:      Other:    []      []            Comment:         Past Medical History:   Diagnosis Date     Cholestasis 2015     Past Surgical History:   Procedure Laterality Date     TUBAL LIGATION N/A      Family History   Problem Relation Age of Onset     Hypertension Mother      Breast Cancer Mother      Hypertension Father      Throat cancer Father      Kidney failure Father      No Known Problems Sister      Pacemaker Sister      No Known Problems Sister      No Known Problems Sister      No Known Problems Brother      Hypertension Maternal Grandmother      Kidney failure Maternal Grandmother      Heart Defect Paternal Grandmother      No Known Problems Son      No Known Problems Daughter      No Known Problems Daughter      No Known Problems Daughter      Social History     Socioeconomic History     Marital status: Single     Spouse name: Not on file     Number of children: Not on file     Years of education: Not on file     Highest education level: Not on file   Occupational History     Not on file   Tobacco Use     Smoking status: Former Smoker     Quit date:      Years since quittin.6     Smokeless tobacco: Never Used   Substance and Sexual Activity     Alcohol use: Not Currently     Drug use: Never     Sexual activity: Not on file   Other Topics Concern     Not on file   Social History Narrative     Not on file     Social Determinants of Health     Financial Resource Strain:      Difficulty of Paying Living Expenses:    Food Insecurity:      Worried About Running  Out of Food in the Last Year:      Ran Out of Food in the Last Year:    Transportation Needs:      Lack of Transportation (Medical):      Lack of Transportation (Non-Medical):    Physical Activity:      Days of Exercise per Week:      Minutes of Exercise per Session:    Stress:      Feeling of Stress :    Social Connections:      Frequency of Communication with Friends and Family:      Frequency of Social Gatherings with Friends and Family:      Attends [employer] Services:      Active Member of Clubs or Organizations:      Attends Club or Organization Meetings:      Marital Status:    Intimate Partner Violence:      Fear of Current or Ex-Partner:      Emotionally Abused:      Physically Abused:      Sexually Abused:        ROS:   CONSTITUTIONAL:  No fevers or chills  EYES: negative for icterus  ENT:  negative for hearing loss, tinnitus and sore throat  RESPIRATORY:  negative for cough, sputum, dyspnea  CARDIOVASCULAR:  negative for chest pain  GASTROINTESTINAL:  negative for nausea, vomiting, diarrhea or constipation  GENITOURINARY:  negative for incontinence, dysuria, bladder emptying problems  HEME:  No easy bruising  INTEGUMENT:  negative for rash and pruritus  NEURO:  Negative for headache, seizure disorder    Allergies:   No Known Allergies    Medications:  Clinic-Administered Medications as of 8/26/2021       Dose Frequency Start End    iohexol (OMNIPAQUE 300) injection 5 mL (Completed) 5 mL ONCE 8/26/2021 8/26/2021    Route: Intravenous    sodium chloride (PF) 0.9% PF flush 5 mL 5 mL ONCE 8/26/2021     Admin Instructions: Post infusion line flush.    Class: E-Prescribe    Route: Intracatheter          Exam:   Temp:  [98.4  F (36.9  C)] 98.4  F (36.9  C)  Pulse:  [64-71] 64  Resp:  [17] 17  BP: (102-110)/(67-75) 108/75  SpO2:  [98 %-100 %] 100 %  There is no height or weight on file to calculate BMI.  Temp:  [98.4  F (36.9  C)] 98.4  F (36.9  C)  Pulse:  [64-71] 64  Resp:  [17] 17  BP: (102-110)/(67-75)  108/75  SpO2:  [98 %-100 %] 100 %  Appearance: in no apparent distress.   Skin: normal  Head and Neck: Normal, no rashes or jaundice  Respiratory: normal respiratory excursions, no audible wheeze  Abdomen: rounded, No distention   Extremeties: Edema, none  Neuro: without deficit grossly      Diagnostics:   Recent Results (from the past 336 hour(s))   Bilirubin direct    Collection Time: 08/26/21  7:50 AM   Result Value Ref Range    Bilirubin Direct 0.1 0.0 - 0.2 mg/dL   Comprehensive metabolic panel    Collection Time: 08/26/21  7:50 AM   Result Value Ref Range    Sodium 142 133 - 144 mmol/L    Potassium 4.0 3.4 - 5.3 mmol/L    Chloride 109 94 - 109 mmol/L    Carbon Dioxide (CO2) 26 20 - 32 mmol/L    Anion Gap 7 3 - 14 mmol/L    Urea Nitrogen 18 7 - 30 mg/dL    Creatinine 0.64 0.52 - 1.04 mg/dL    Calcium 9.0 8.5 - 10.1 mg/dL    Glucose 101 (H) 70 - 99 mg/dL    Alkaline Phosphatase 52 40 - 150 U/L    AST 14 0 - 45 U/L    ALT 27 0 - 50 U/L    Protein Total 7.0 6.8 - 8.8 g/dL    Albumin 3.7 3.4 - 5.0 g/dL    Bilirubin Total 0.5 0.2 - 1.3 mg/dL    GFR Estimate >90 >60 mL/min/1.73m2   Lipid Profile    Collection Time: 08/26/21  7:50 AM   Result Value Ref Range    Cholesterol 242 (H) <200 mg/dL    Triglycerides 59 <150 mg/dL    Direct Measure HDL 59 >=50 mg/dL    LDL Cholesterol Calculated 171 (H) <=100 mg/dL    Non HDL Cholesterol 183 (H) <130 mg/dL    Patient Fasting > 8hrs? Yes    Uric acid    Collection Time: 08/26/21  7:50 AM   Result Value Ref Range    Uric Acid 4.8 2.6 - 6.0 mg/dL   Phosphorus    Collection Time: 08/26/21  7:50 AM   Result Value Ref Range    Phosphorus 3.6 2.5 - 4.5 mg/dL   Hemoglobin A1c    Collection Time: 08/26/21  7:50 AM   Result Value Ref Range    Hemoglobin A1C 5.0 0.0 - 5.6 %   INR    Collection Time: 08/26/21  7:50 AM   Result Value Ref Range    INR 1.01 0.85 - 1.15   Partial thromboplastin time    Collection Time: 08/26/21  7:50 AM   Result Value Ref Range    aPTT 27 22 - 38 Seconds    CBC with platelets    Collection Time: 08/26/21  7:50 AM   Result Value Ref Range    WBC Count 4.2 4.0 - 11.0 10e3/uL    RBC Count 4.24 3.80 - 5.20 10e6/uL    Hemoglobin 13.1 11.7 - 15.7 g/dL    Hematocrit 38.3 35.0 - 47.0 %    MCV 90 78 - 100 fL    MCH 30.9 26.5 - 33.0 pg    MCHC 34.2 31.5 - 36.5 g/dL    RDW 11.9 10.0 - 15.0 %    Platelet Count 276 150 - 450 10e3/uL   Routine UA with microscopic    Collection Time: 08/26/21  7:50 AM   Result Value Ref Range    Color Urine Yellow Colorless, Straw, Light Yellow, Yellow    Appearance Urine Clear Clear    Glucose Urine Negative Negative mg/dL    Bilirubin Urine Negative Negative    Ketones Urine Negative Negative mg/dL    Specific Gravity Urine 1.020 1.003 - 1.035    Blood Urine Negative Negative    pH Urine 5.0 5.0 - 7.0    Protein Albumin Urine Negative Negative mg/dL    Urobilinogen Urine Normal Normal, 2.0 mg/dL    Nitrite Urine Positive (A) Negative    Leukocyte Esterase Urine Trace (A) Negative    Bacteria Urine Many (A) None Seen /HPF    RBC Urine <1 <=2 /HPF    WBC Urine 12 (H) <=5 /HPF    Squamous Epithelials Urine 2 (H) <=1 /HPF   HCG quantitative pregnancy    Collection Time: 08/26/21  7:50 AM   Result Value Ref Range    hCG Quantitative <1 0 - 5 IU/L

## 2021-08-26 NOTE — LETTER
8/26/2021         RE: Mahi Victor  [address] [place] [address]        Dear Colleague,    Thank you for referring your patient, Mahi Victor, to the [facility]. Please see a copy of my visit note below.    Transplant Surgery Consult Note    Medical record number: [id number]  YOB: 1990,   Consult requested by the patient for evaluation of kidney donation candidacy.    Assessment and Recommendations: Ms. Victor appears to be a good candidate for kidney donation at this point in the evaluation. The following issues will need to be addressed prior to formal review:    Iohexol, CT, labs     The majority of our visit today was spent in counseling regarding the medical and surgical risks of kidney donation, the typical luc-and post-operative experience and recovery/return to work pattern.  We also talked about post-op visits and longer term health care maintenance, as well as the implications of having one remaining kidney. This discussion included, but was not limited to rates of complications such as bleeding, infection, need for transfusion, reoperation, other organ injury, future bowel obstruction, incisional hernia, port site pain, varicocele, venous thrombosis, pulmonary embolism, renal failure, and death (3 per 10,000). The patient understands that if end stage renal failure happens that dialysis or transplant would be required.   At the conclusion of the visit, all questions had been answered and Ms. Victor's candidacy for donation will be reviewed at our Multidisciplinary Donor Selection Committee. She will stay in contact with the nurse coordinator with any concerns.      Total time: 35 minutes  Counselling time: 30 minutes    Dru Kelly MD, PhD  Associate Professor of Surgery      ---------------------------------------------------------------------------------------------------    HPI: Mahi Victor is a 31 year old year old female who presents for a  kidney donor evaluation.  Patient would like to donate to her .      Personal history of:   No    Yes  Cancer:    [x]      []             Comment:     Diabetes   [x]      []  Comment:    Thombosis   [x]      []  Comment:       Hepatitis   [x]      []  Comment:    Tuberculosis   [x]      []  Comment:   Back or neck pain:  [x]      []  Comment:      Kidney stones   [x]      []  Comment:                  Kidney infections  [x]      []  Comment:           Urinary retention   [x]      []            Comment:   Regular NSAID use:  [x]      []            Comment:      Constipation:   [x]      []            Comment:      Mormonism  [x]      []            Comment:      Other:    []      []            Comment:         Past Medical History:   Diagnosis Date     Cholestasis 2015     Past Surgical History:   Procedure Laterality Date     TUBAL LIGATION N/A      Family History   Problem Relation Age of Onset     Hypertension Mother      Breast Cancer Mother      Hypertension Father      Throat cancer Father      Kidney failure Father      No Known Problems Sister      Pacemaker Sister      No Known Problems Sister      No Known Problems Sister      No Known Problems Brother      Hypertension Maternal Grandmother      Kidney failure Maternal Grandmother      Heart Defect Paternal Grandmother      No Known Problems Son      No Known Problems Daughter      No Known Problems Daughter      No Known Problems Daughter      Social History     Socioeconomic History     Marital status: Single     Spouse name: Not on file     Number of children: Not on file     Years of education: Not on file     Highest education level: Not on file   Occupational History     Not on file   Tobacco Use     Smoking status: Former Smoker     Quit date:      Years since quittin.6     Smokeless tobacco: Never Used   Substance and Sexual Activity     Alcohol use: Not Currently     Drug use: Never     Sexual activity: Not on file    Other Topics Concern     Not on file   Social History Narrative     Not on file     Social Determinants of Health     Financial Resource Strain:      Difficulty of Paying Living Expenses:    Food Insecurity:      Worried About Running Out of Food in the Last Year:      Ran Out of Food in the Last Year:    Transportation Needs:      Lack of Transportation (Medical):      Lack of Transportation (Non-Medical):    Physical Activity:      Days of Exercise per Week:      Minutes of Exercise per Session:    Stress:      Feeling of Stress :    Social Connections:      Frequency of Communication with Friends and Family:      Frequency of Social Gatherings with Friends and Family:      Attends [employer] Services:      Active Member of Clubs or Organizations:      Attends Club or Organization Meetings:      Marital Status:    Intimate Partner Violence:      Fear of Current or Ex-Partner:      Emotionally Abused:      Physically Abused:      Sexually Abused:        ROS:   CONSTITUTIONAL:  No fevers or chills  EYES: negative for icterus  ENT:  negative for hearing loss, tinnitus and sore throat  RESPIRATORY:  negative for cough, sputum, dyspnea  CARDIOVASCULAR:  negative for chest pain  GASTROINTESTINAL:  negative for nausea, vomiting, diarrhea or constipation  GENITOURINARY:  negative for incontinence, dysuria, bladder emptying problems  HEME:  No easy bruising  INTEGUMENT:  negative for rash and pruritus  NEURO:  Negative for headache, seizure disorder    Allergies:   No Known Allergies    Medications:  Clinic-Administered Medications as of 8/26/2021       Dose Frequency Start End    iohexol (OMNIPAQUE 300) injection 5 mL (Completed) 5 mL ONCE 8/26/2021 8/26/2021    Route: Intravenous    sodium chloride (PF) 0.9% PF flush 5 mL 5 mL ONCE 8/26/2021     Admin Instructions: Post infusion line flush.    Class: E-Prescribe    Route: Intracatheter          Exam:   Temp:  [98.4  F (36.9  C)] 98.4  F (36.9  C)  Pulse:  [64-71]  64  Resp:  [17] 17  BP: (102-110)/(67-75) 108/75  SpO2:  [98 %-100 %] 100 %  There is no height or weight on file to calculate BMI.  Temp:  [98.4  F (36.9  C)] 98.4  F (36.9  C)  Pulse:  [64-71] 64  Resp:  [17] 17  BP: (102-110)/(67-75) 108/75  SpO2:  [98 %-100 %] 100 %  Appearance: in no apparent distress.   Skin: normal  Head and Neck: Normal, no rashes or jaundice  Respiratory: normal respiratory excursions, no audible wheeze  Abdomen: rounded, No distention   Extremeties: Edema, none  Neuro: without deficit grossly      Diagnostics:   Recent Results (from the past 336 hour(s))   Bilirubin direct    Collection Time: 08/26/21  7:50 AM   Result Value Ref Range    Bilirubin Direct 0.1 0.0 - 0.2 mg/dL   Comprehensive metabolic panel    Collection Time: 08/26/21  7:50 AM   Result Value Ref Range    Sodium 142 133 - 144 mmol/L    Potassium 4.0 3.4 - 5.3 mmol/L    Chloride 109 94 - 109 mmol/L    Carbon Dioxide (CO2) 26 20 - 32 mmol/L    Anion Gap 7 3 - 14 mmol/L    Urea Nitrogen 18 7 - 30 mg/dL    Creatinine 0.64 0.52 - 1.04 mg/dL    Calcium 9.0 8.5 - 10.1 mg/dL    Glucose 101 (H) 70 - 99 mg/dL    Alkaline Phosphatase 52 40 - 150 U/L    AST 14 0 - 45 U/L    ALT 27 0 - 50 U/L    Protein Total 7.0 6.8 - 8.8 g/dL    Albumin 3.7 3.4 - 5.0 g/dL    Bilirubin Total 0.5 0.2 - 1.3 mg/dL    GFR Estimate >90 >60 mL/min/1.73m2   Lipid Profile    Collection Time: 08/26/21  7:50 AM   Result Value Ref Range    Cholesterol 242 (H) <200 mg/dL    Triglycerides 59 <150 mg/dL    Direct Measure HDL 59 >=50 mg/dL    LDL Cholesterol Calculated 171 (H) <=100 mg/dL    Non HDL Cholesterol 183 (H) <130 mg/dL    Patient Fasting > 8hrs? Yes    Uric acid    Collection Time: 08/26/21  7:50 AM   Result Value Ref Range    Uric Acid 4.8 2.6 - 6.0 mg/dL   Phosphorus    Collection Time: 08/26/21  7:50 AM   Result Value Ref Range    Phosphorus 3.6 2.5 - 4.5 mg/dL   Hemoglobin A1c    Collection Time: 08/26/21  7:50 AM   Result Value Ref Range    Hemoglobin  A1C 5.0 0.0 - 5.6 %   INR    Collection Time: 08/26/21  7:50 AM   Result Value Ref Range    INR 1.01 0.85 - 1.15   Partial thromboplastin time    Collection Time: 08/26/21  7:50 AM   Result Value Ref Range    aPTT 27 22 - 38 Seconds   CBC with platelets    Collection Time: 08/26/21  7:50 AM   Result Value Ref Range    WBC Count 4.2 4.0 - 11.0 10e3/uL    RBC Count 4.24 3.80 - 5.20 10e6/uL    Hemoglobin 13.1 11.7 - 15.7 g/dL    Hematocrit 38.3 35.0 - 47.0 %    MCV 90 78 - 100 fL    MCH 30.9 26.5 - 33.0 pg    MCHC 34.2 31.5 - 36.5 g/dL    RDW 11.9 10.0 - 15.0 %    Platelet Count 276 150 - 450 10e3/uL   Routine UA with microscopic    Collection Time: 08/26/21  7:50 AM   Result Value Ref Range    Color Urine Yellow Colorless, Straw, Light Yellow, Yellow    Appearance Urine Clear Clear    Glucose Urine Negative Negative mg/dL    Bilirubin Urine Negative Negative    Ketones Urine Negative Negative mg/dL    Specific Gravity Urine 1.020 1.003 - 1.035    Blood Urine Negative Negative    pH Urine 5.0 5.0 - 7.0    Protein Albumin Urine Negative Negative mg/dL    Urobilinogen Urine Normal Normal, 2.0 mg/dL    Nitrite Urine Positive (A) Negative    Leukocyte Esterase Urine Trace (A) Negative    Bacteria Urine Many (A) None Seen /HPF    RBC Urine <1 <=2 /HPF    WBC Urine 12 (H) <=5 /HPF    Squamous Epithelials Urine 2 (H) <=1 /HPF   HCG quantitative pregnancy    Collection Time: 08/26/21  7:50 AM   Result Value Ref Range    hCG Quantitative <1 0 - 5 IU/L             Again, thank you for allowing me to participate in the care of your patient.        Sincerely,        Dru Kelly MD

## 2021-08-26 NOTE — NURSING NOTE
Dalton Champagne in clinic on 21 for Living Kidney Donor Evaluation.     She is interested in donation for her spouse Lasha Turner.    I provided a folder which included copies of the followin. Living Kidney Donor Evaluation Consent  2. Paired Exchange Consent  3. Donor Shield Pamphlet  4. Living Donor Collective Study information  5. Kidney for Life pamphlet  6. Kidney Donors are Heroes! Study synopsis  7. SRTR Data from 21.      I also provided a parking pass and food voucher.      I reviewed the Living Kidney Donor Evaluation Consent, dated 2020 and Paired Exchange/ NDD consent dated 2018.  I answered any question.    Evaluation Notes:  Internal tissue typing collected and sent. Dr. Queen advised repeat UA in 1 week. Order placed and Mahi updated. She will go to a local clinic next week to complete.

## 2021-08-26 NOTE — PROGRESS NOTES
"Chief Complaint   Patient presents with     Iohexal test     Donor Iohexol test    Mahi Victor presents today to [facility] for a Donor Iohexol test.      Progress note:  ID verified by name and .     The following information was verified with the patient:  Female Patients is there any possibility of being pregnant No  Is there a history of allergy (skin rash, swelling, ect) to:   A.  Iodine (except skin reactions to betadine): No   B. Intravenous radio-contrast agents: No   C. Seafood No     present during visit today: Not Applicable.    R.N. provided patient with educational handout regarding timed test. Yes    24 gauge PIV placed in right AC and Iohexol administered over 2 minutes.  Positive blood return verified before and after injection. PIV removed.  20 gauge PIV placed in left AC (by lab)  for blood draws and CT this afternoon.    Medication administered:  Iohexol (Omnipaque 300mg iodine/ml concentration) 5 mls.    Start time: 817  Stop time: 819    Drug Waste Record    Drug Name: OmniPaque (iohexal)  Dose: 5 ml  Route administered: IV  NDC #: 0407-1413-10  Amount of waste(mL): 5 ml  Reason for waste: Single use vial      Evaluation nurse in transplant to draw labs at 2 and 4 hours post iohexol administration.  Patient given a slip with the times to get labs drawn and verbalized understanding of the plan.    Patient tolerated the procedure:  Yes    After the infusion patient was discharged to the next appointment.    Administrations This Visit     iohexol (OMNIPAQUE 300) injection 5 mL     Admin Date  2021 Action  Given Dose  5 mL Route  Intravenous Administered By  Era Fraser RN              Vital signs:  Temp: 98.4  F (36.9  C) Temp src: Oral BP: 102/67 Pulse: 71   Resp: 17 SpO2: 98 %     Height: 165.1 cm (5' 5\") Weight: 73.6 kg (162 lb 4.8 oz)  Estimated body mass index is 27.01 kg/m  as calculated from the following:    Height as of this encounter: 1.651 m (5' 5\").    Weight as " of this encounter: 73.6 kg (162 lb 4.8 oz).

## 2021-08-26 NOTE — PROGRESS NOTES
[facility] Solid Organ Transplant  Outpatient MNT: Kidney Donor Evaluation    Current BMI: 27 (HT 65 in,  lbs/74 kg)  BMI is within recommendation of <30 for kidney donation    8 Year Estimated Risk of T2DM  </= 3%     Time Spent: 15 minutes  Visit Type: Initial   Referring Physician: Finger  Pt accompanied by: self    Nutrition Assessment  Vitamins, Supplements, Pertinent Meds: none   Herbal Medicines/Supplements: none    Weight hx: lost 40 lbs x 6 months (intentional)- keto diet (not doing strictly anymore); weight stable x 2 months     Food Security: any concerns about having enough money to buy food or access to grocery stores? No     Diet Recall  Breakfast Avocado with salami or protein/granola bar    Lunch Salad with avocado    Dinner Chicken breast with veggies (onions/bell peppers)   Snacks None   Beverages Water, celsius energy drink, small amount of milk      Alcohol 1x/month    Dining out 1x/week      Physical Activity  Works out- cardio and weights 3-4x/week      Labs  Chol 242 TG 59 HDL 59     Family hx of high cholesterol- was high at age 12-13    FBG = 101; 96 10/2020  A1c = 5  BP = wnl x 3     Prediction of Incident Diabetes Mellitus in Middle-aged Adults: The [place] Offspring Study  Shorty Pineda MD; James B. Meigs, MD, MPH; Karin Castillo, PhD; Nory Gtz MD, MPH; Wilber Lomas MD; Jack Soto Sr,   PhD  Pt's estimated risk for T2DM (per Table 6 above)  Pt received points for the following criteria: BMI>25  Total points: 2  8-Year estimated risk of T2DM: </= 3%    Nutrition Diagnosis  No nutrition diagnosis identified at this time.    Nutrition Intervention  Nutrition education provided:  Reviewed overall healthy diet guidelines for pre and post kidney donation. Discussed maintenance of a healthy weight and Na+ intake <3000 mg/day (<2000 mg/day if HTN).    Reviewed high cholesterol and impact of family hx and diet. Encouraged adequate intake of fiber  and reviewed that if she is not strictly following keto, ketosis is not occurring and cholesterol may be impacted by dietary fat quantity.     Avoid the following post op d/t unknown effects on the organs:  - Herbal, Chinese, holistic, chiropractic, natural, alternative medicines and supplements  - Detoxes and cleanses  - Weight loss pills  - Protein powders or other products with extracts or herbs (ie green tea extract)    Patient Understanding: Pt verbalized understanding of education provided.  Expected Engagement: Good  Follow-Up Plans: PRN     Nutrition Goals  No nutrition goals identified at this time     Ila Macdonald, RD, LD, CCTD

## 2021-08-26 NOTE — LETTER
8/26/2021       RE: Mahi Victor  [address] [place] [address]     Dear Colleague,    Thank you for referring your patient, Mahi Victor, to the [facility] [place] at [facility]. Please see a copy of my visit note below.    TRANSPLANT NEPHROLOGY DONOR EVALUATION    Assessment and Plan:  # Prospective Kidney Transplant Donor: Patient with a couple of issues that need to be addressed prior to donation. Patient's blood pressure is acceptable at this visit, kidney function appears to be acceptable with Iohexol pending, and urinalysis is abnormal.    # Pyuria: Patient with probably asymptomatic urinary tract infection, although the urinalysis was slightly contaminated.  Recommend repeating urinalysis in a week or two.    # Dyslipidemia: Elevated total cholesterol and LDL with normal HDL and triglycerides.  Likely genetically driven, but discussed a balanced, healthy diet and continued weight loss.  Patient to follow up with PCP as needed.    # Elevated Fasting Glucose: Minimally elevated fasting glucose, but normal HbA1c.  Recommend health diet and exercise.    Discussed the risks of donating a kidney, including the surgical risk and the possible risks of living with one kidney.    Education about expected post-donation kidney function and how chronic kidney disease (CKD) and end stage kidney disease (ESKD) might potentially impact the donor in the future, include, but not limited to:       - On average, donors will have 25-35% permanent loss of kidney function at donation.       - Baseline risk of ESKD may slightly exceed that of members of the general population with the same demographic profile.       - Donor risks must be interpreted in light of known epidemiology of both CKD or ESKD, such as that CKD generally develops in midlife (40-50 years old) and ESKD generally develops after age 60.       - Medical evaluation of young  potential donors cannot predict lifetime risk of CKD or ESKD.       - Donors may be at higher risk for CKD if they sustain damage to the remaining kidney.       - Development of CKD and progression of ESKD may be more rapid with only 1 kidney.       - Some type of kidney replacement therapy, either kidney transplant or dialysis, is required when reaching ESKD.    Potential medical or surgical risks include, but not limited to:       - Death.       - Scars, pain, fatigue, and other consequences typical of any surgical procedure.       - Decreased kidney function.       - Abdominal or bowel symptoms, such as bloating and nausea, and developing bowel obstruction.       - Kidney failure (ESKD) and the need for a kidney transplant or dialysis for the donor.       - Impact of obesity, hypertension, or other donor-specific medical conditions on morbidity and mortality of the potential donor.    Patients overall evaluation will be discussed with the transplant team and a final recommendation on the patients' suitability to be a kidney transplant donor will be made at that time.    Consult:  Mahi Victor was seen in consultation at the request of Dr. Kary Heath for evaluation as a potential kidney transplant donor.    Reason for Visit:  Mahi Victor is a 31 year old female who presents for a kidney donor evaluation.  Patient would like to donate to Lasha Turner, her .    HPI:    Energy level is good and has been normal.  She is active and does get some exercise.  Denies any chest pain or shortness of breath with exertion.  Appetite is good.  She has intentionally lost weight, now down about 30 lbs in the last year, eating a keto diet.  Weight is more stable lately.  No nausea, vomiting or diarrhea.  No fever, sweats or chills.  No leg swelling.  No pain or burning with urination.         Kidney Disease Hx:       h/o Kidney Problems: No  Family h/o Genetic Kidney Disease: No       h/o HTN: No    Usual BP:  Doesn't know       h/o Protein in Urine: No  h/o Blood in Urine: No       h/o Kidney Stones: No  h/o Kidney Injury: No       h/o Recurrent UTI: No  h/o Genitourinary Problems: No       h/o Chronic NSAID Use: No         Other Medical Hx:       h/o DM: No             h/o Gastrointestinal, Pancreas or Liver Problems: No       h/o Lung or Heart Problems: No       h/o Hematologic Problems: No  h/o Bleeding or Clotting Problems: No       h/o Cancer: No       h/o Infection Problems: No       h/o Gestational DM: No      h/o Preeclampsia: No         Skin Cancer Risk:       h/o more than 50 moles: No       h/o extensive sun exposure: No       h/o melanoma: No       Family h/o melanoma: No         Mental Health Assessment:       h/o Depression: No       h/o Psychiatric Illness: No       h/o Suicidal Attempt(s): No    Review Of Systems:   A comprehensive review of systems was obtained and negative, except as noted in the HPI or PMH.    Past Medical History:   History was taken from the patient as noted below.  Past Medical History:   Diagnosis Date     Cholestasis 01/06/2015       Past Social History:   Past Surgical History:   Procedure Laterality Date     TUBAL LIGATION N/A      Personal or family history of anesthesia problems: No    Family History:   Family History   Problem Relation Age of Onset     Hypertension Mother      Breast Cancer Mother      Hypertension Father      Throat cancer Father      Kidney failure Father      No Known Problems Sister      Pacemaker Sister      No Known Problems Sister      No Known Problems Sister      No Known Problems Brother      Hypertension Maternal Grandmother      Kidney failure Maternal Grandmother      Heart Defect Paternal Grandmother      No Known Problems Son      No Known Problems Daughter      No Known Problems Daughter      No Known Problems Daughter           Specific Family History in First Degree Relatives:       FH of Kidney Dz: No FH of DM: No       FH of HTN: Yes   FH  of CAD: No       FH of Cancer: Yes   FH of Kidney Cancer: No    Personal History:   Social History     Socioeconomic History     Marital status:      Spouse name: Not on file     Number of children: 4     Years of education: Not on file     Highest education level: Not on file   Occupational History     Not on file   Tobacco Use     Smoking status: Former Smoker     Quit date:      Years since quittin.6     Smokeless tobacco: Never Used   Substance and Sexual Activity     Alcohol use: Not Currently     Alcohol/week: 0.0 - 1.0 standard drinks     Drug use: Never     Sexual activity: Not on file   Other Topics Concern     Not on file   Social History Narrative     Not on file     Social Determinants of Health     Financial Resource Strain:      Difficulty of Paying Living Expenses:    Food Insecurity:      Worried About Running Out of Food in the Last Year:      Ran Out of Food in the Last Year:    Transportation Needs:      Lack of Transportation (Medical):      Lack of Transportation (Non-Medical):    Physical Activity:      Days of Exercise per Week:      Minutes of Exercise per Session:    Stress:      Feeling of Stress :    Social Connections:      Frequency of Communication with Friends and Family:      Frequency of Social Gatherings with Friends and Family:      Attends Bahai Services:      Active Member of Clubs or Organizations:      Attends Club or Organization Meetings:      Marital Status:    Intimate Partner Violence:      Fear of Current or Ex-Partner:      Emotionally Abused:      Physically Abused:      Sexually Abused:           Specific Social History:       Health Insurance Status: Yes       Employment Status: Full time  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     No current facility-administered medications for this visit.     Facility-Administered Medications Ordered in Other Visits   Medication     sodium chloride (PF) 0.9% PF flush 5 mL     There are no discontinued medications.      Vitals:  Vital Signs 8/26/2021 8/26/2021 8/26/2021   Systolic 105 105 108   Diastolic 75 70 75   Pulse - - -   Temperature - - -   Respirations - - -   Weight (LB) - - -   Height - - -   BMI (Calculated) - - -   Pain - - -   O2 - - -       Exam:   GENERAL APPEARANCE: alert and no distress  HENT: mouth without ulcers or lesions  LYMPHATICS: no cervical or supraclavicular nodes  RESP: lungs clear to auscultation - no rales, rhonchi or wheezes  CV: regular rhythm, normal rate, no rub, no murmur  EDEMA: no LE edema bilaterally  ABDOMEN: soft, nondistended, nontender, bowel sounds normal  MS: extremities normal - no gross deformities noted, no evidence of inflammation in joints, no muscle tenderness  SKIN: no rash    Results:   Labs and imaging were ordered for this visit and reviewed by me.  Recent Results (from the past 336 hour(s))   Bilirubin direct    Collection Time: 08/26/21  7:50 AM   Result Value Ref Range    Bilirubin Direct 0.1 0.0 - 0.2 mg/dL   Comprehensive metabolic panel    Collection Time: 08/26/21  7:50 AM   Result Value Ref Range    Sodium 142 133 - 144 mmol/L    Potassium 4.0 3.4 - 5.3 mmol/L    Chloride 109 94 - 109 mmol/L    Carbon Dioxide (CO2) 26 20 - 32 mmol/L    Anion Gap 7 3 - 14 mmol/L    Urea Nitrogen 18 7 - 30 mg/dL    Creatinine 0.64 0.52 - 1.04 mg/dL    Calcium 9.0 8.5 - 10.1 mg/dL    Glucose 101 (H) 70 - 99 mg/dL    Alkaline Phosphatase 52 40 - 150 U/L    AST 14 0 - 45 U/L    ALT 27 0 - 50 U/L    Protein Total 7.0 6.8 - 8.8 g/dL    Albumin 3.7 3.4 - 5.0 g/dL    Bilirubin Total 0.5 0.2 - 1.3 mg/dL    GFR Estimate >90 >60 mL/min/1.73m2   Lipid Profile    Collection Time: 08/26/21  7:50 AM    Result Value Ref Range    Cholesterol 242 (H) <200 mg/dL    Triglycerides 59 <150 mg/dL    Direct Measure HDL 59 >=50 mg/dL    LDL Cholesterol Calculated 171 (H) <=100 mg/dL    Non HDL Cholesterol 183 (H) <130 mg/dL    Patient Fasting > 8hrs? Yes    Uric acid    Collection Time: 08/26/21  7:50 AM   Result Value Ref Range    Uric Acid 4.8 2.6 - 6.0 mg/dL   Phosphorus    Collection Time: 08/26/21  7:50 AM   Result Value Ref Range    Phosphorus 3.6 2.5 - 4.5 mg/dL   Hemoglobin A1c    Collection Time: 08/26/21  7:50 AM   Result Value Ref Range    Hemoglobin A1C 5.0 0.0 - 5.6 %   INR    Collection Time: 08/26/21  7:50 AM   Result Value Ref Range    INR 1.01 0.85 - 1.15   Partial thromboplastin time    Collection Time: 08/26/21  7:50 AM   Result Value Ref Range    aPTT 27 22 - 38 Seconds   CBC with platelets    Collection Time: 08/26/21  7:50 AM   Result Value Ref Range    WBC Count 4.2 4.0 - 11.0 10e3/uL    RBC Count 4.24 3.80 - 5.20 10e6/uL    Hemoglobin 13.1 11.7 - 15.7 g/dL    Hematocrit 38.3 35.0 - 47.0 %    MCV 90 78 - 100 fL    MCH 30.9 26.5 - 33.0 pg    MCHC 34.2 31.5 - 36.5 g/dL    RDW 11.9 10.0 - 15.0 %    Platelet Count 276 150 - 450 10e3/uL   Albumin Random Urine Quantitative with Creat Ratio    Collection Time: 08/26/21  7:50 AM   Result Value Ref Range    Creatinine Urine mg/dL 127 mg/dL    Albumin Urine mg/L 11 mg/L    Albumin Urine mg/g Cr 8.66 0.00 - 25.00 mg/g Cr   Protein  random urine with Creat Ratio    Collection Time: 08/26/21  7:50 AM   Result Value Ref Range    Total Protein Random Urine g/L 0.10 g/L    Total Protein Urine g/gr Creatinine 0.08 0.00 - 0.20 g/g Cr    Creatinine Urine mg/dL 127 mg/dL   Routine UA with microscopic    Collection Time: 08/26/21  7:50 AM   Result Value Ref Range    Color Urine Yellow Colorless, Straw, Light Yellow, Yellow    Appearance Urine Clear Clear    Glucose Urine Negative Negative mg/dL    Bilirubin Urine Negative Negative    Ketones Urine Negative Negative  mg/dL    Specific Gravity Urine 1.020 1.003 - 1.035    Blood Urine Negative Negative    pH Urine 5.0 5.0 - 7.0    Protein Albumin Urine Negative Negative mg/dL    Urobilinogen Urine Normal Normal, 2.0 mg/dL    Nitrite Urine Positive (A) Negative    Leukocyte Esterase Urine Trace (A) Negative    Bacteria Urine Many (A) None Seen /HPF    RBC Urine <1 <=2 /HPF    WBC Urine 12 (H) <=5 /HPF    Squamous Epithelials Urine 2 (H) <=1 /HPF   Hepatitis B core antibody    Collection Time: 08/26/21  7:50 AM   Result Value Ref Range    Hepatitis B Core Antibody Total Nonreactive Nonreactive   Hepatitis B Surface Antibody    Collection Time: 08/26/21  7:50 AM   Result Value Ref Range    Hepatitis B Surface Antibody 3.87 <8.00 m[IU]/mL   Hepatitis B surface antigen    Collection Time: 08/26/21  7:50 AM   Result Value Ref Range    Hepatitis B Surface Antigen Nonreactive Nonreactive   Hepatitis C antibody    Collection Time: 08/26/21  7:50 AM   Result Value Ref Range    Hepatitis C Antibody Nonreactive Nonreactive   HIV Antigen Antibody Combo Pretransplant    Collection Time: 08/26/21  7:50 AM   Result Value Ref Range    HIV Antigen Antibody Combo Pretransplant Nonreactive Nonreactive   Treponema Abs w Reflex to RPR and Titer    Collection Time: 08/26/21  7:50 AM   Result Value Ref Range    Treponema Antibody Total Nonreactive Nonreactive   CMV Antibody IgG    Collection Time: 08/26/21  7:50 AM   Result Value Ref Range    CMV Alejandra IgG Instrument Value 1.20 (H) <0.60 U/mL    CMV Antibody IgG Positive, suggests recent or past exposure. (A) No detectable antibody.    EBV Capsid Antibody IgG    Collection Time: 08/26/21  7:50 AM   Result Value Ref Range    EBV Capsid Alejandra IgG Instrument Value <10.0 <18.0 U/mL    EBV Capsid Antibody IgG No detectable antibody. No detectable antibody.   EBV Capsid Antibody IgM    Collection Time: 08/26/21  7:50 AM   Result Value Ref Range    EBV Capsid Alejandra IgM Instrument Value <10.0 <36.0 U/mL    EBV Capsid  Antibody IgM No detectable antibody. No detectable antibody.   HCG quantitative pregnancy    Collection Time: 08/26/21  7:50 AM   Result Value Ref Range    hCG Quantitative <1 0 - 5 IU/L   Adult Type and Screen    Collection Time: 08/26/21  7:50 AM   Result Value Ref Range    ABO/RH(D) B POS     Antibody Screen Negative Negative    SPECIMEN EXPIRATION DATE [id number]    ABO and Rh    Collection Time: 08/26/21  8:07 AM   Result Value Ref Range    ABO/RH(D) B POS     SPECIMEN EXPIRATION DATE [id number]    EKG 12-lead complete w/read - Clinics    Collection Time: 08/26/21  1:18 PM   Result Value Ref Range    Systolic Blood Pressure  mmHg    Diastolic Blood Pressure  mmHg    Ventricular Rate 72 BPM    Atrial Rate 72 BPM    PA Interval 150 ms    QRS Duration 94 ms     ms    QTc 427 ms    P Axis 37 degrees    R AXIS 57 degrees    T Axis 34 degrees    Interpretation ECG       Sinus rhythm with sinus arrhythmia  Normal ECG  No previous ECGs available                 Again, thank you for allowing me to participate in the care of your patient.      Sincerely,    Rigoberto Queen MD

## 2021-08-27 LAB
QUANTIFERON MITOGEN: 10 IU/ML
QUANTIFERON NIL TUBE: 0.06 IU/ML
QUANTIFERON TB1 TUBE: 0.28 IU/ML
QUANTIFERON TB2 TUBE: 0.34

## 2021-08-28 LAB
ATRIAL RATE - MUSE: 72 BPM
DIASTOLIC BLOOD PRESSURE - MUSE: NORMAL MMHG
INTERPRETATION ECG - MUSE: NORMAL
P AXIS - MUSE: 37 DEGREES
PR INTERVAL - MUSE: 150 MS
QRS DURATION - MUSE: 94 MS
QT - MUSE: 390 MS
QTC - MUSE: 427 MS
R AXIS - MUSE: 57 DEGREES
SYSTOLIC BLOOD PRESSURE - MUSE: NORMAL MMHG
T AXIS - MUSE: 34 DEGREES
VENTRICULAR RATE- MUSE: 72 BPM

## 2021-08-30 LAB
GAMMA INTERFERON BACKGROUND BLD IA-ACNC: 0.06 IU/ML
M TB IFN-G BLD-IMP: NEGATIVE
M TB IFN-G CD4+ BCKGRND COR BLD-ACNC: 9.94 IU/ML
MITOGEN IGNF BCKGRD COR BLD-ACNC: 0.22 IU/ML
MITOGEN IGNF BCKGRD COR BLD-ACNC: 0.28 IU/ML

## 2021-08-31 ENCOUNTER — DOCUMENTATION ONLY (OUTPATIENT)
Dept: TRANSPLANT | Facility: CLINIC | Age: 31
End: 2021-08-31

## 2021-08-31 LAB
BSA: 1.85 M2
IOHEXOL CL UR+SERPL-VRATE: 103 /1.73 M2
IOHEXOL CL UR+SERPL-VRATE: 110 ML/MIN
IOHEXOL CL UR+SERPL-VRATE: 3.26 MG/DL
IOHEXOL CL UR+SERPL-VRATE: 7.34 MG/DL

## 2021-08-31 NOTE — PROGRESS NOTES
Image Review Meeting    ATTENDEES: Jamie BENEDICT, Olya Daniel, Mar Odell, COLE Wheeler S. Dreis    IMAGES REVIEWED: CTA angio from 8/26/21                                                                   Impression:      1.   1a. The right kidney contains one artery, 2 veins, and one ureter.  1b. The right kidney parenchyma demonstrates a too small to  characterize bilobed hypodensity in the inferior pole, likely simple  renal cyst or 2 small adjacent benign renal cysts. No hydronephrosis  or renal stone. The renal volume is 207.1 cc.     2.   2a. The left kidney contains one artery, one vein, and one ureter.  2b. The left kidney parenchyma is unremarkable. The renal volume is  185.59 cc.     DECISION: LEFT      INCIDENTALS: Yes:   The right kidney parenchyma demonstrates a too small to  characterize bilobed hypodensity in the inferior pole, likely simple  renal cyst or 2 small adjacent benign renal cysts. No hydronephrosis or renal stone.

## 2021-09-01 ENCOUNTER — TELEPHONE (OUTPATIENT)
Dept: TRANSPLANT | Facility: CLINIC | Age: 31
End: 2021-09-01

## 2021-09-01 ENCOUNTER — COMMITTEE REVIEW (OUTPATIENT)
Dept: TRANSPLANT | Facility: CLINIC | Age: 31
End: 2021-09-01

## 2021-09-01 LAB
A*: NORMAL
A*LOCUS SEROLOGIC EQUIVALENT: 1
A*LOCUS: NORMAL
A*SEROLOGIC EQUIVALENT: 32
ABTEST METHOD: NORMAL
B*: NORMAL
B*LOCUS SEROLOGIC EQUIVALENT: 8
B*LOCUS: NORMAL
B*SEROLOGIC EQUIVALENT: 61
BW-1: NORMAL
C*: NORMAL
C*LOCUS SEROLOGIC EQUIVALENT: 7
C*LOCUS: NORMAL
C*SEROLOGIC EQUIVALENT: 15
DPA1*: NORMAL
DPB1*: NORMAL
DPB1*LOCUS NMDP: NORMAL
DPB1*LOCUS: NORMAL
DPB1*NMDP: NORMAL
DQA1*: NORMAL
DQA1*LOCUS: NORMAL
DQB1*: NORMAL
DQB1*LOCUS SEROLOGIC EQUIVALENT: 2
DQB1*LOCUS: NORMAL
DQB1*SEROLOGIC EQUIVALENT: 7
DRB1*: NORMAL
DRB1*LOCUS SEROLOGIC EQUIVALENT: 17
DRB1*LOCUS: NORMAL
DRB1*SEROLOGIC EQUIVALENT: 4
DRB3*LOCUS SEROLOGIC EQUIVALENT: 52
DRB3*LOCUS: NORMAL
DRB4*: NORMAL
DRB4*SEROLOGIC EQUIVALENT: 53
DRSSO TEST METHOD: NORMAL

## 2021-09-01 NOTE — TELEPHONE ENCOUNTER
Talked with Mahi and she is going to get her UA repeated tomorrow 9/2/21.  She knows she is approved pending the UA results.  I will look for those results and discussed that would need those prior to scheduling any surgery.  Timeframe is anytime she said and would make it work.  Will call her back with UA results and talk about next steps after that.

## 2021-09-01 NOTE — COMMITTEE REVIEW
Living Donor Committee Review Note Evaluation Date: 8/26/2021  Committee Review Date: 9/1/2021    Donor being evaluated for: Kidney    Transplant Phase: Evaluation  Transplant Status: Active    Transplant Coordinator: Radha Mclaughlin  Transplant Surgeon:       Committee Review Members:  Immunology Wilber Guan, PhD   Nephrology Robb Pimentel MD, Rigoberto Queen MD   Nutrition Ila Macdonald,    Pharmacist Cecilia Tidwell, [facility]    - Clinical Marilee Mclaughlin, [facility], Shantell Caraballo, [facility]   Transplant Macie Enid Gonzalez, RN, Michael Rendon, ANALY, Katya Cruz, LPN, Olya Daniel, ANALY, Paulette Shin, APRN CNP, Mar Odell, ANALY, Radha Mclaughlin, RN   Transplant Surgery Angie Ervin MD, MD       Transplant Eligibility: Acceptable Physical Health, Acceptable Mental Health    Committee Review Decision: Needs Re-presentation    Relative Contraindications: None    Absolute Contraindications: None    Committee Chair Angie Ervin MD verbally attested to the committee's decision.    Committee Discussion Details:   Approved pending repeat UA (will have completed week of 8/30/21)

## 2021-09-17 ENCOUNTER — TELEPHONE (OUTPATIENT)
Dept: TRANSPLANT | Facility: CLINIC | Age: 31
End: 2021-09-17

## 2021-09-17 NOTE — TELEPHONE ENCOUNTER
Spoke with Mahi regarding her repeat urine sample. She hasn't done it yet and was hoping to do it on Monday 9/20/21. She said they would fax over the results.

## 2021-09-26 ENCOUNTER — HEALTH MAINTENANCE LETTER (OUTPATIENT)
Age: 31
End: 2021-09-26

## 2021-10-12 ENCOUNTER — ALLIED HEALTH/NURSE VISIT (OUTPATIENT)
Dept: TRANSPLANT | Facility: CLINIC | Age: 31
End: 2021-10-12

## 2021-10-12 DIAGNOSIS — Z52.4 KIDNEY DONOR: ICD-10-CM

## 2021-10-12 DIAGNOSIS — Z52.4 KIDNEY DONOR: Primary | ICD-10-CM

## 2021-10-12 LAB
ALBUMIN UR-MCNC: NEGATIVE MG/DL
APPEARANCE UR: CLEAR
BILIRUB UR QL STRIP: NEGATIVE
COLOR UR AUTO: NORMAL
GLUCOSE UR STRIP-MCNC: NEGATIVE MG/DL
HGB UR QL STRIP: NEGATIVE
KETONES UR STRIP-MCNC: NEGATIVE MG/DL
LEUKOCYTE ESTERASE UR QL STRIP: NEGATIVE
NITRATE UR QL: NEGATIVE
PH UR STRIP: 7 [PH] (ref 5–7)
RBC URINE: 0 /HPF
SP GR UR STRIP: 1.01 (ref 1–1.03)
SQUAMOUS EPITHELIAL: 1 /HPF
UROBILINOGEN UR STRIP-MCNC: NORMAL MG/DL
WBC URINE: <1 /HPF

## 2021-10-12 PROCEDURE — 81001 URINALYSIS AUTO W/SCOPE: CPT

## 2021-10-27 ENCOUNTER — TELEPHONE (OUTPATIENT)
Dept: TRANSPLANT | Facility: CLINIC | Age: 31
End: 2021-10-27

## 2021-10-27 ENCOUNTER — COMMITTEE REVIEW (OUTPATIENT)
Dept: TRANSPLANT | Facility: CLINIC | Age: 31
End: 2021-10-27

## 2021-10-27 NOTE — COMMITTEE REVIEW
Living Donor Committee Review Note Evaluation Date: 8/26/2021  Committee Review Date: 10/27/2021    Donor being evaluated for: Kidney    Transplant Phase: Evaluation  Transplant Status: Active    Transplant Coordinator: Radha Mclaughlin  Transplant Surgeon:       Committee Review Members:  Immunology Wilber Guan, PhD, Carol Palacios   Nephrology Robb Pimentel MD, Candice Flores MD, Rigoberto Queen MD, Nicola Crespo MD   Nutrition Ila Macdonald,    Pharmacist Patrick Sheldon, [facility], Cecilia Tidwell, [facility]    - Clinical Marilee Mclaughlin, [facility], Shantell Caraballo, [facility]   Transplant Macie Enid Gonzalez, RN, Verna Casillas, RN, Michael Rendon, RN, Katya Cruz, [facility], Olya Daniel, ANALY, Mar Odell, RN, Radha Mclaughlin, RN   Transplant Surgery Dru Kelly MD, Kary Heath MD       Transplant Eligibility: Acceptable Physical Health, Acceptable Mental Health    Committee Review Decision: Approved-pending pap smear results    Relative Contraindications: None    Absolute Contraindications: None    Committee Chair Nicola Crespo MD verbally attested to the committee's decision.    Committee Discussion Details:   1. UA repeated and acceptable  2. Will need to completed pap smear with negative results to move forward.     1/21/22 @ 1601  Addendum:  Received Pap smear results from 5/17/19-negative. Mahi is not due for another pap smear until 2024.

## 2021-10-27 NOTE — TELEPHONE ENCOUNTER
LM for Mahi to call me and let me know if she completed her pap smear or has it scheduled. I did inform her that her new UA was acceptable and no further testing was needed. If pap smear comes back negative we can proceed with donation.     Mahi called back and said she is scheduled to get a pap smear on 11/4/21 and will get results faxed over to us. If those results come back negative we should be able to proceed into the PEP if her  is ready. She understood and would make sure we got the results after completing pap smear.

## 2021-11-17 ENCOUNTER — DOCUMENTATION ONLY (OUTPATIENT)
Dept: TRANSPLANT | Facility: CLINIC | Age: 31
End: 2021-11-17

## 2021-11-17 NOTE — CONFIDENTIAL NOTE
Pharmacy Living Kidney Donor Medication Evaluation     This patient is a 31 year old female being considered for living kidney donation. As part of the kidney pre-donation patient evaluation, pharmacy has screened this patient's electronic medical record for medication related concerns.    Assessment / Plan    No significant potential medication related issues are expected for this patient post surgery, based on the medical record medication list review.  Pharmacy will continue to participate in this patient's care throughout the surgery course.  Please contact pharmacy with any further medication related questions or concerns.     Cecilia Tidwell [facility]  [phone]

## 2021-11-23 ENCOUNTER — DOCUMENTATION ONLY (OUTPATIENT)
Dept: TRANSPLANT | Facility: CLINIC | Age: 31
End: 2021-11-23

## 2022-01-13 ENCOUNTER — DOCUMENTATION ONLY (OUTPATIENT)
Dept: TRANSPLANT | Facility: CLINIC | Age: 32
End: 2022-01-13
Payer: COMMERCIAL

## 2022-01-16 ENCOUNTER — HEALTH MAINTENANCE LETTER (OUTPATIENT)
Age: 32
End: 2022-01-16

## 2022-02-16 ENCOUNTER — TELEPHONE (OUTPATIENT)
Dept: TRANSPLANT | Facility: CLINIC | Age: 32
End: 2022-02-16
Payer: COMMERCIAL

## 2022-02-16 NOTE — TELEPHONE ENCOUNTER
Spoke to Mahi after receiving her pap results.  She thought she was needing to get another pap smear but she had completed hers 5/17/19 with negative results. She is currently not due for another pap smear until 2024. She is open to do advance donation or PEP. She is open to donation after March 14th with her work schedule. Will hand her off to Olya and let Mahi know if she has more questions to let me know.

## 2022-03-03 ENCOUNTER — TELEPHONE (OUTPATIENT)
Dept: TRANSPLANT | Facility: CLINIC | Age: 32
End: 2022-03-03
Payer: COMMERCIAL

## 2022-03-03 NOTE — TELEPHONE ENCOUNTER
I called the patient today and introduced myself as her new Coordinator.  I stated that I will be sending her an important document via [employer].  We made plan to have conversation tomorrow at 3pm via a Teams meeting to review the document and information regarding listing in Kidney Paired Exchange.  I answered the patient's questions.  I have sent her docusign KPD Eval Forms and teams meeting invitation.  Olya Daniel RN  Living Donor Coordinator  03/03/2022 4:33 PM

## 2022-03-04 ENCOUNTER — TELEPHONE (OUTPATIENT)
Dept: TRANSPLANT | Facility: CLINIC | Age: 32
End: 2022-03-04
Payer: COMMERCIAL

## 2022-03-04 NOTE — TELEPHONE ENCOUNTER
Today I spoke to the patient for the purposes of reviewing Kidney Paired Donation information and to introduce myself as her Coordinator.  We spoke via Teams Meeting on camera.  The patient has offered to be a donor in the paired exchange program for her  Jude Turner.  They are ABO incompatible.  Mahi is ABO B and her  is ABO O with Anti B Titer >256.  Timeline for donation she is ready for donation and can be active as soon as possible. She works at [employer] and has Mondays off usually.   I reviewed details pertaining to the Paired Exchange programs including an introduction of the advance donation program.                       Reviewed that the KPD waiting time is unknown.  I reviewed the intended recipient's antibody panel and reviewed their ABO match power. She stated desire to donate in advance and then she will be able to do more caregiving for Jude when it is his turn.  I reviewed the risks of the Advance donation voucher program.       I reviewed that frequent lab draws are necessary in the D programs. [facility] will send a kit when we initiate the listing to store the blood by freezing the sample and use it for exploratory crossmatches. The patient is willing to go to lab on 3/14 at 10:30 for her cyro kit.  I have made this appointment and she confirmed understanding.  Once a match offer is made, a fresh blood sample will be needed for the final confirmatory compatibility testing, as well as infectious disease testing.  I reviewed billing policy for the donor evaluation and for the KPD program.    I will send to the patient the Docusign D consent forms and Release of Information form.  I gave instructions on how to return them to me for practitioner signature.  I will also email the patient in order to give my contact information and I will provide the patient with the NK[facility] Advance donation consent form and the Donor wage and travel reimbursement.  Mahi does not have a preference for  surgeon.    Mahi does not have a preference for the approach for incision, standard mid- line is okay.    She denies taking medications. She is fully vaccinated for Covid-19.    I obtained demographic information in order to register UNOS ID.   Questions answered.  I stated I would be available to answer any questions that come up.    It is okay to speak to the recipient.  I will ask that the Recipient coordinator work on getting KPD and ADP consent from Recipient.   Olya Daniel RN  Living Donor Coordinator  03/04/2022 4:49 PM

## 2022-03-17 ENCOUNTER — APPOINTMENT (OUTPATIENT)
Dept: LAB | Facility: CLINIC | Age: 32
End: 2022-03-17

## 2022-03-25 ENCOUNTER — DOCUMENTATION ONLY (OUTPATIENT)
Dept: TRANSPLANT | Facility: CLINIC | Age: 32
End: 2022-03-25
Payer: COMMERCIAL

## 2022-03-25 NOTE — PROGRESS NOTES
As of today patient is exportable active donor in [facility].  She has filed her ADP consent and designated her  Lasha Turner as standard Voucher gonzales.  Recipient has signed consent for ADP.  Both consents are uploaded into [facility].  Patient submitted her cryo specimen last week and today the lab has finished processing the vials.  Listing Data entered by this nurse.  HLA data verified by the Immunology Lab.  Olya Daniel RN  Living Donor Coordinator  03/25/2022 1:34 PM

## 2022-03-29 DIAGNOSIS — Z52.4 ENCOUNTER FOR DONATION OF KIDNEY: ICD-10-CM

## 2022-03-29 DIAGNOSIS — Z00.5 EXAMINATION OF POTENTIAL DONOR OF ORGAN AND TISSUE: Primary | ICD-10-CM

## 2022-04-11 DIAGNOSIS — Z52.4 KIDNEY DONOR: Primary | ICD-10-CM

## 2022-04-11 DIAGNOSIS — Z00.5 EXAMINATION OF POTENTIAL DONOR OF ORGAN AND TISSUE: ICD-10-CM

## 2022-04-12 ENCOUNTER — ALLIED HEALTH/NURSE VISIT (OUTPATIENT)
Dept: TRANSPLANT | Facility: CLINIC | Age: 32
End: 2022-04-12
Attending: SURGERY

## 2022-04-12 ENCOUNTER — OFFICE VISIT (OUTPATIENT)
Dept: TRANSPLANT | Facility: CLINIC | Age: 32
End: 2022-04-12
Attending: SURGERY

## 2022-04-12 ENCOUNTER — LAB (OUTPATIENT)
Dept: LAB | Facility: CLINIC | Age: 32
End: 2022-04-12
Attending: SURGERY
Payer: COMMERCIAL

## 2022-04-12 VITALS
BODY MASS INDEX: 26.48 KG/M2 | HEART RATE: 73 BPM | DIASTOLIC BLOOD PRESSURE: 74 MMHG | SYSTOLIC BLOOD PRESSURE: 116 MMHG | WEIGHT: 156.7 LBS | OXYGEN SATURATION: 99 %

## 2022-04-12 DIAGNOSIS — Z00.5 EXAMINATION OF POTENTIAL DONOR OF ORGAN AND TISSUE: ICD-10-CM

## 2022-04-12 LAB
ABO/RH(D): NORMAL
ALBUMIN UR-MCNC: NEGATIVE MG/DL
ANTIBODY SCREEN: NEGATIVE
APPEARANCE UR: CLEAR
B-HCG SERPL-ACNC: <1 IU/L (ref 0–5)
BACTERIA #/AREA URNS HPF: ABNORMAL /HPF
BILIRUB UR QL STRIP: NEGATIVE
COLOR UR AUTO: YELLOW
CREAT SERPL-MCNC: 0.68 MG/DL (ref 0.52–1.04)
GFR SERPL CREATININE-BSD FRML MDRD: >90 ML/MIN/1.73M2
GLUCOSE UR STRIP-MCNC: NEGATIVE MG/DL
HGB BLD-MCNC: 13.8 G/DL (ref 11.7–15.7)
HGB UR QL STRIP: NEGATIVE
KETONES UR STRIP-MCNC: NEGATIVE MG/DL
LEUKOCYTE ESTERASE UR QL STRIP: NEGATIVE
NITRATE UR QL: POSITIVE
PH UR STRIP: 5 [PH] (ref 5–7)
RBC URINE: 0 /HPF
SARS-COV-2 RNA RESP QL NAA+PROBE: NEGATIVE
SP GR UR STRIP: 1.01 (ref 1–1.03)
SPECIMEN EXPIRATION DATE: NORMAL
SQUAMOUS EPITHELIAL: 1 /HPF
UROBILINOGEN UR STRIP-MCNC: NORMAL MG/DL
WBC URINE: 2 /HPF

## 2022-04-12 PROCEDURE — 86665 EPSTEIN-BARR CAPSID VCA: CPT | Mod: 90 | Performed by: PATHOLOGY

## 2022-04-12 PROCEDURE — 87521 HEPATITIS C PROBE&RVRS TRNSC: CPT | Mod: 90 | Performed by: PATHOLOGY

## 2022-04-12 PROCEDURE — 86900 BLOOD TYPING SEROLOGIC ABO: CPT | Mod: 90 | Performed by: PATHOLOGY

## 2022-04-12 PROCEDURE — 99215 OFFICE O/P EST HI 40 MIN: CPT | Performed by: NURSE PRACTITIONER

## 2022-04-12 PROCEDURE — 99000 SPECIMEN HANDLING OFFICE-LAB: CPT | Performed by: PATHOLOGY

## 2022-04-12 PROCEDURE — 84702 CHORIONIC GONADOTROPIN TEST: CPT | Performed by: PATHOLOGY

## 2022-04-12 PROCEDURE — 87186 SC STD MICRODIL/AGAR DIL: CPT | Mod: 90 | Performed by: PATHOLOGY

## 2022-04-12 PROCEDURE — 81001 URINALYSIS AUTO W/SCOPE: CPT | Performed by: PATHOLOGY

## 2022-04-12 PROCEDURE — 87798 DETECT AGENT NOS DNA AMP: CPT | Mod: 90 | Performed by: PATHOLOGY

## 2022-04-12 PROCEDURE — 36415 COLL VENOUS BLD VENIPUNCTURE: CPT | Performed by: PATHOLOGY

## 2022-04-12 PROCEDURE — 86850 RBC ANTIBODY SCREEN: CPT | Mod: 90 | Performed by: PATHOLOGY

## 2022-04-12 PROCEDURE — 82565 ASSAY OF CREATININE: CPT | Performed by: PATHOLOGY

## 2022-04-12 PROCEDURE — U0003 INFECTIOUS AGENT DETECTION BY NUCLEIC ACID (DNA OR RNA); SEVERE ACUTE RESPIRATORY SYNDROME CORONAVIRUS 2 (SARS-COV-2) (CORONAVIRUS DISEASE [COVID-19]), AMPLIFIED PROBE TECHNIQUE, MAKING USE OF HIGH THROUGHPUT TECHNOLOGIES AS DESCRIBED BY CMS-2020-01-R: HCPCS | Mod: 90 | Performed by: PATHOLOGY

## 2022-04-12 PROCEDURE — 87086 URINE CULTURE/COLONY COUNT: CPT | Mod: 90 | Performed by: PATHOLOGY

## 2022-04-12 PROCEDURE — 86901 BLOOD TYPING SEROLOGIC RH(D): CPT | Mod: 90 | Performed by: PATHOLOGY

## 2022-04-12 PROCEDURE — 87516 HEPATITIS B DNA AMP PROBE: CPT | Mod: 90 | Performed by: PATHOLOGY

## 2022-04-12 PROCEDURE — 87535 HIV-1 PROBE&REVERSE TRNSCRPJ: CPT | Mod: 90 | Performed by: PATHOLOGY

## 2022-04-12 PROCEDURE — 85018 HEMOGLOBIN: CPT | Performed by: PATHOLOGY

## 2022-04-12 PROCEDURE — U0005 INFEC AGEN DETEC AMPLI PROBE: HCPCS | Mod: 90 | Performed by: PATHOLOGY

## 2022-04-12 NOTE — LETTER
2022         RE: Mahi Victor  [address] [place] [address]        Dear Colleague,    Thank you for referring your patient, Mahi Victor, to the [facility]. Please see a copy of my visit note below.    Transplant Surgery H&P                                                        HPI:                                                      Ms. Victor is a 31 year old famle who comes to clinic today for preop prior to planned laparoscopic kidney donation surgery. The patient was previously reviewed by the living donor multidisciplinary selection committee and found to be medically and psychosocially appropriate for voluntary kidney donation. The patient denies any feelings of being pressured to proceed with kidney donation.  Health events since donor evaluation: None    Special considerations:   Constipation: no  PONV: no  History of Urinary retention? no  Significant neck/back/joint concerns for lateral decubitus positioning?: No  Episcopalian: No    MEDICAL HISTORY:                                                      Patient Active Problem List    Diagnosis Date Noted     Transplant donor evaluation 2021     Priority: Medium      Past Medical History:   Diagnosis Date     Cholestasis 2015     Past Surgical History:   Procedure Laterality Date     TUBAL LIGATION N/A      No current outpatient medications on file.     OTC products: None, except as noted above  No Known Allergies   Social History     Tobacco Use     Smoking status: Former Smoker     Quit date: 2016     Years since quittin.2     Smokeless tobacco: Never Used   Substance Use Topics     Alcohol use: Not Currently     Alcohol/week: 0.0 - 1.0 standard drinks     History   Drug Use Unknown       REVIEW OF SYSTEMS:                                                    CONSTITUTIONAL: NEGATIVE for fever, chills, change in weight  INTEGUMENTARY/SKIN: NEGATIVE for worrisome rashes, moles or  lesions  EYES: NEGATIVE for vision changes or irritation  ENT/MOUTH: NEGATIVE for ear, mouth and throat problems  RESP: NEGATIVE for significant cough or SOB  CV: NEGATIVE for chest pain, palpitations or peripheral edema  GI: NEGATIVE for nausea, abdominal pain, heartburn, or change in bowel habits  : NEGATIVE for frequency, dysuria, or hematuria  MUSCULOSKELETAL: NEGATIVE for significant arthralgias or myalgia  NEURO: NEGATIVE for weakness, dizziness or paresthesias  ENDOCRINE: NEGATIVE for temperature intolerance, skin/hair changes  HEME: NEGATIVE for bleeding problems  PSYCHIATRIC: NEGATIVE for changes in mood or affect    EXAM:                                                    /74   Pulse 73   Wt 71.1 kg (156 lb 11.2 oz)   SpO2 99%   BMI 26.48 kg/m      GENERAL APPEARANCE: healthy, alert and no distress     EYES: EOMI, PERRL     HENT: ear canals and TM's normal and nose and mouth without ulcers or lesions     NECK: no adenopathy, no asymmetry, masses, or scars and thyroid normal to palpation     RESP: lungs clear to auscultation - no rales, rhonchi or wheezes     CV: regular rates and rhythm, normal S1 S2, no S3 or S4 and no murmur, click or rub     ABDOMEN:  soft, nontender, no HSM or masses and bowel sounds normal     MS: extremities normal- no gross deformities noted, no evidence of inflammation in joints, FROM in all extremities.     SKIN: no suspicious lesions or rashes     NEURO: Normal strength and tone, sensory exam grossly normal, mentation intact and speech normal     PSYCH: mentation appears normal. and affect normal/bright     LYMPHATICS: No cervical adenopathy    DIAGNOSTICS:                                                      EKG: appears normal, NSR, normal axis, normal intervals, no acute ST/T changes c/w ischemia, no LVH by voltage criteria, unchanged from previous tracings  Chest XRay  Labs Resulted Today:   Results for orders placed or performed in visit on 04/12/22   HCG  quantitative pregnancy     Status: Normal   Result Value Ref Range    hCG Quantitative <1 0 - 5 IU/L   UA with Microscopic reflex to Culture     Status: Abnormal    Specimen: Urine, Midstream   Result Value Ref Range    Color Urine Yellow Colorless, Straw, Light Yellow, Yellow    Appearance Urine Clear Clear    Glucose Urine Negative Negative mg/dL    Bilirubin Urine Negative Negative    Ketones Urine Negative Negative mg/dL    Specific Gravity Urine 1.011 1.003 - 1.035    Blood Urine Negative Negative    pH Urine 5.0 5.0 - 7.0    Protein Albumin Urine Negative Negative mg/dL    Urobilinogen Urine Normal Normal, 2.0 mg/dL    Nitrite Urine Positive (A) Negative    Leukocyte Esterase Urine Negative Negative    Bacteria Urine Many (A) None Seen /HPF    RBC Urine 0 <=2 /HPF    WBC Urine 2 <=5 /HPF    Squamous Epithelials Urine 1 <=1 /HPF    Narrative    Urine Culture ordered based on laboratory criteria   Hemoglobin     Status: Normal   Result Value Ref Range    Hemoglobin 13.8 11.7 - 15.7 g/dL   Creatinine     Status: Normal   Result Value Ref Range    Creatinine 0.68 0.52 - 1.04 mg/dL    GFR Estimate >90 >60 mL/min/1.73m2   ABO/Rh type and screen     Status: None (In process)    Narrative    The following orders were created for panel order ABO/Rh type and screen.  Procedure                               Abnormality         Status                     ---------                               -----------         ------                     Adult Type and Screen[[id number]]                            In process                   Please view results for these tests on the individual orders.     Labs Drawn and in Process:   Unresulted Labs Ordered in the Past 30 Days of this Admission     Date and Time Order Name Status Description    4/12/2022 10:57 AM URINE CULTURE In process     4/12/2022 10:06 AM COVID-19 VIRUS (CORONAVIRUS) BY PCR In process     4/12/2022 10:05 AM TYPE AND SCREEN, ADULT In process     4/12/2022 10:05 AM  HBV HCV HIV WNV BY MARIELOS In process     4/12/2022 10:05 AM EBV CAPSID ANTIBODY IGM In process     4/12/2022 10:05 AM EBV CAPSID ANTIBODY IGG In process         Recent Labs   Lab Test 04/12/22  1013 08/26/21  0750   HGB 13.8 13.1   PLT  --  276   INR  --  1.01   NA  --  142   POTASSIUM  --  4.0   CR 0.68 0.64   A1C  --  5.0      Assessment:                                                    Healthy voluntary kidney donor    There have been no significant intercurrent medical problems or change of intent in proceeding with kidney donation as scheduled on 4/21/2022.    1. Labs reviewed and within normal limits: Yes.  Labs discussed with patient  2. EKG (8/26/2021): appears normal, NSR  3. Paired Exchange case: Yes  4. ABO= B POS  5. Laterality: left  6. Outstanding issues: final ABO, cross match and COVID-19 test    Plan:                                                      1. Consent: Not Done  2. Outstanding issues: final ABO, cross match and COVID-19 test      Signed Electronically by: Dari Roland NP        Again, thank you for allowing me to participate in the care of your patient.        Sincerely,        Dari oRland NP

## 2022-04-12 NOTE — PROGRESS NOTES
Transplant Surgery H&P                                                        HPI:                                                      Ms. Victor is a 31 year old famle who comes to clinic today for preop prior to planned laparoscopic kidney donation surgery. The patient was previously reviewed by the living donor multidisciplinary selection committee and found to be medically and psychosocially appropriate for voluntary kidney donation. The patient denies any feelings of being pressured to proceed with kidney donation.  Health events since donor evaluation: None    Special considerations:   Constipation: no  PONV: no  History of Urinary retention? no  Significant neck/back/joint concerns for lateral decubitus positioning?: No  Episcopalian: No    MEDICAL HISTORY:                                                      Patient Active Problem List    Diagnosis Date Noted     Transplant donor evaluation 2021     Priority: Medium      Past Medical History:   Diagnosis Date     Cholestasis 2015     Past Surgical History:   Procedure Laterality Date     TUBAL LIGATION N/A      No current outpatient medications on file.     OTC products: None, except as noted above  No Known Allergies   Social History     Tobacco Use     Smoking status: Former Smoker     Quit date:      Years since quittin.2     Smokeless tobacco: Never Used   Substance Use Topics     Alcohol use: Not Currently     Alcohol/week: 0.0 - 1.0 standard drinks     History   Drug Use Unknown       REVIEW OF SYSTEMS:                                                    CONSTITUTIONAL: NEGATIVE for fever, chills, change in weight  INTEGUMENTARY/SKIN: NEGATIVE for worrisome rashes, moles or lesions  EYES: NEGATIVE for vision changes or irritation  ENT/MOUTH: NEGATIVE for ear, mouth and throat problems  RESP: NEGATIVE for significant cough or SOB  CV: NEGATIVE for chest pain, palpitations or peripheral edema  GI: NEGATIVE for nausea, abdominal  pain, heartburn, or change in bowel habits  : NEGATIVE for frequency, dysuria, or hematuria  MUSCULOSKELETAL: NEGATIVE for significant arthralgias or myalgia  NEURO: NEGATIVE for weakness, dizziness or paresthesias  ENDOCRINE: NEGATIVE for temperature intolerance, skin/hair changes  HEME: NEGATIVE for bleeding problems  PSYCHIATRIC: NEGATIVE for changes in mood or affect    EXAM:                                                    /74   Pulse 73   Wt 71.1 kg (156 lb 11.2 oz)   SpO2 99%   BMI 26.48 kg/m      GENERAL APPEARANCE: healthy, alert and no distress     EYES: EOMI, PERRL     HENT: ear canals and TM's normal and nose and mouth without ulcers or lesions     NECK: no adenopathy, no asymmetry, masses, or scars and thyroid normal to palpation     RESP: lungs clear to auscultation - no rales, rhonchi or wheezes     CV: regular rates and rhythm, normal S1 S2, no S3 or S4 and no murmur, click or rub     ABDOMEN:  soft, nontender, no HSM or masses and bowel sounds normal     MS: extremities normal- no gross deformities noted, no evidence of inflammation in joints, FROM in all extremities.     SKIN: no suspicious lesions or rashes     NEURO: Normal strength and tone, sensory exam grossly normal, mentation intact and speech normal     PSYCH: mentation appears normal. and affect normal/bright     LYMPHATICS: No cervical adenopathy    DIAGNOSTICS:                                                      EKG: appears normal, NSR, normal axis, normal intervals, no acute ST/T changes c/w ischemia, no LVH by voltage criteria, unchanged from previous tracings  Chest XRay  Labs Resulted Today:   Results for orders placed or performed in visit on 04/12/22   HCG quantitative pregnancy     Status: Normal   Result Value Ref Range    hCG Quantitative <1 0 - 5 IU/L   UA with Microscopic reflex to Culture     Status: Abnormal    Specimen: Urine, Midstream   Result Value Ref Range    Color Urine Yellow Colorless, Straw, Light  Yellow, Yellow    Appearance Urine Clear Clear    Glucose Urine Negative Negative mg/dL    Bilirubin Urine Negative Negative    Ketones Urine Negative Negative mg/dL    Specific Gravity Urine 1.011 1.003 - 1.035    Blood Urine Negative Negative    pH Urine 5.0 5.0 - 7.0    Protein Albumin Urine Negative Negative mg/dL    Urobilinogen Urine Normal Normal, 2.0 mg/dL    Nitrite Urine Positive (A) Negative    Leukocyte Esterase Urine Negative Negative    Bacteria Urine Many (A) None Seen /HPF    RBC Urine 0 <=2 /HPF    WBC Urine 2 <=5 /HPF    Squamous Epithelials Urine 1 <=1 /HPF    Narrative    Urine Culture ordered based on laboratory criteria   Hemoglobin     Status: Normal   Result Value Ref Range    Hemoglobin 13.8 11.7 - 15.7 g/dL   Creatinine     Status: Normal   Result Value Ref Range    Creatinine 0.68 0.52 - 1.04 mg/dL    GFR Estimate >90 >60 mL/min/1.73m2   ABO/Rh type and screen     Status: None (In process)    Narrative    The following orders were created for panel order ABO/Rh type and screen.  Procedure                               Abnormality         Status                     ---------                               -----------         ------                     Adult Type and Screen[[id number]]                            In process                   Please view results for these tests on the individual orders.     Labs Drawn and in Process:   Unresulted Labs Ordered in the Past 30 Days of this Admission     Date and Time Order Name Status Description    4/12/2022 10:57 AM URINE CULTURE In process     4/12/2022 10:06 AM COVID-19 VIRUS (CORONAVIRUS) BY PCR In process     4/12/2022 10:05 AM TYPE AND SCREEN, ADULT In process     4/12/2022 10:05 AM HBV HCV HIV WNV BY MARIELOS In process     4/12/2022 10:05 AM EBV CAPSID ANTIBODY IGM In process     4/12/2022 10:05 AM EBV CAPSID ANTIBODY IGG In process         Recent Labs   Lab Test 04/12/22  1013 08/26/21  0750   HGB 13.8 13.1   PLT  --  276   INR  --  1.01   NA   --  142   POTASSIUM  --  4.0   CR 0.68 0.64   A1C  --  5.0      Assessment:                                                    Healthy voluntary kidney donor    There have been no significant intercurrent medical problems or change of intent in proceeding with kidney donation as scheduled on 4/21/2022.    1. Labs reviewed and within normal limits: Yes.  Labs discussed with patient  2. EKG (8/26/2021): appears normal, NSR  3. Paired Exchange case: Yes  4. ABO= B POS  5. Laterality: left  6. Outstanding issues: final ABO, cross match and COVID-19 test    Plan:                                                      1. Consent: Not Done  2. Outstanding issues: final ABO, cross match and COVID-19 test      Signed Electronically by: Dari Roland NP

## 2022-04-12 NOTE — H&P (VIEW-ONLY)
Transplant Surgery H&P                                                        HPI:                                                      Ms. Victor is a 31 year old famle who comes to clinic today for preop prior to planned laparoscopic kidney donation surgery. The patient was previously reviewed by the living donor multidisciplinary selection committee and found to be medically and psychosocially appropriate for voluntary kidney donation. The patient denies any feelings of being pressured to proceed with kidney donation.  Health events since donor evaluation: None    Special considerations:   Constipation: no  PONV: no  History of Urinary retention? no  Significant neck/back/joint concerns for lateral decubitus positioning?: No  Latter-day: No    MEDICAL HISTORY:                                                      Patient Active Problem List    Diagnosis Date Noted     Transplant donor evaluation 2021     Priority: Medium      Past Medical History:   Diagnosis Date     Cholestasis 2015     Past Surgical History:   Procedure Laterality Date     TUBAL LIGATION N/A      No current outpatient medications on file.     OTC products: None, except as noted above  No Known Allergies   Social History     Tobacco Use     Smoking status: Former Smoker     Quit date:      Years since quittin.2     Smokeless tobacco: Never Used   Substance Use Topics     Alcohol use: Not Currently     Alcohol/week: 0.0 - 1.0 standard drinks     History   Drug Use Unknown       REVIEW OF SYSTEMS:                                                    CONSTITUTIONAL: NEGATIVE for fever, chills, change in weight  INTEGUMENTARY/SKIN: NEGATIVE for worrisome rashes, moles or lesions  EYES: NEGATIVE for vision changes or irritation  ENT/MOUTH: NEGATIVE for ear, mouth and throat problems  RESP: NEGATIVE for significant cough or SOB  CV: NEGATIVE for chest pain, palpitations or peripheral edema  GI: NEGATIVE for nausea, abdominal  pain, heartburn, or change in bowel habits  : NEGATIVE for frequency, dysuria, or hematuria  MUSCULOSKELETAL: NEGATIVE for significant arthralgias or myalgia  NEURO: NEGATIVE for weakness, dizziness or paresthesias  ENDOCRINE: NEGATIVE for temperature intolerance, skin/hair changes  HEME: NEGATIVE for bleeding problems  PSYCHIATRIC: NEGATIVE for changes in mood or affect    EXAM:                                                    /74   Pulse 73   Wt 71.1 kg (156 lb 11.2 oz)   SpO2 99%   BMI 26.48 kg/m      GENERAL APPEARANCE: healthy, alert and no distress     EYES: EOMI, PERRL     HENT: ear canals and TM's normal and nose and mouth without ulcers or lesions     NECK: no adenopathy, no asymmetry, masses, or scars and thyroid normal to palpation     RESP: lungs clear to auscultation - no rales, rhonchi or wheezes     CV: regular rates and rhythm, normal S1 S2, no S3 or S4 and no murmur, click or rub     ABDOMEN:  soft, nontender, no HSM or masses and bowel sounds normal     MS: extremities normal- no gross deformities noted, no evidence of inflammation in joints, FROM in all extremities.     SKIN: no suspicious lesions or rashes     NEURO: Normal strength and tone, sensory exam grossly normal, mentation intact and speech normal     PSYCH: mentation appears normal. and affect normal/bright     LYMPHATICS: No cervical adenopathy    DIAGNOSTICS:                                                      EKG: appears normal, NSR, normal axis, normal intervals, no acute ST/T changes c/w ischemia, no LVH by voltage criteria, unchanged from previous tracings  Chest XRay  Labs Resulted Today:   Results for orders placed or performed in visit on 04/12/22   HCG quantitative pregnancy     Status: Normal   Result Value Ref Range    hCG Quantitative <1 0 - 5 IU/L   UA with Microscopic reflex to Culture     Status: Abnormal    Specimen: Urine, Midstream   Result Value Ref Range    Color Urine Yellow Colorless, Straw, Light  Yellow, Yellow    Appearance Urine Clear Clear    Glucose Urine Negative Negative mg/dL    Bilirubin Urine Negative Negative    Ketones Urine Negative Negative mg/dL    Specific Gravity Urine 1.011 1.003 - 1.035    Blood Urine Negative Negative    pH Urine 5.0 5.0 - 7.0    Protein Albumin Urine Negative Negative mg/dL    Urobilinogen Urine Normal Normal, 2.0 mg/dL    Nitrite Urine Positive (A) Negative    Leukocyte Esterase Urine Negative Negative    Bacteria Urine Many (A) None Seen /HPF    RBC Urine 0 <=2 /HPF    WBC Urine 2 <=5 /HPF    Squamous Epithelials Urine 1 <=1 /HPF    Narrative    Urine Culture ordered based on laboratory criteria   Hemoglobin     Status: Normal   Result Value Ref Range    Hemoglobin 13.8 11.7 - 15.7 g/dL   Creatinine     Status: Normal   Result Value Ref Range    Creatinine 0.68 0.52 - 1.04 mg/dL    GFR Estimate >90 >60 mL/min/1.73m2   ABO/Rh type and screen     Status: None (In process)    Narrative    The following orders were created for panel order ABO/Rh type and screen.  Procedure                               Abnormality         Status                     ---------                               -----------         ------                     Adult Type and Screen[[id number]]                            In process                   Please view results for these tests on the individual orders.     Labs Drawn and in Process:   Unresulted Labs Ordered in the Past 30 Days of this Admission     Date and Time Order Name Status Description    4/12/2022 10:57 AM URINE CULTURE In process     4/12/2022 10:06 AM COVID-19 VIRUS (CORONAVIRUS) BY PCR In process     4/12/2022 10:05 AM TYPE AND SCREEN, ADULT In process     4/12/2022 10:05 AM HBV HCV HIV WNV BY MARIELOS In process     4/12/2022 10:05 AM EBV CAPSID ANTIBODY IGM In process     4/12/2022 10:05 AM EBV CAPSID ANTIBODY IGG In process         Recent Labs   Lab Test 04/12/22  1013 08/26/21  0750   HGB 13.8 13.1   PLT  --  276   INR  --  1.01   NA   --  142   POTASSIUM  --  4.0   CR 0.68 0.64   A1C  --  5.0      Assessment:                                                    Healthy voluntary kidney donor    There have been no significant intercurrent medical problems or change of intent in proceeding with kidney donation as scheduled on 4/21/2022.    1. Labs reviewed and within normal limits: Yes.  Labs discussed with patient  2. EKG (8/26/2021): appears normal, NSR  3. Paired Exchange case: Yes  4. ABO= B POS  5. Laterality: left  6. Outstanding issues: final ABO, cross match and COVID-19 test    Plan:                                                      1. Consent: Not Done  2. Outstanding issues: final ABO, cross match and COVID-19 test      Signed Electronically by: Dari Roland NP

## 2022-04-12 NOTE — NURSING NOTE
Donor in clinic today for preparation for surgery.  The patient will be donating to another transplant center's  recipient as part of a kidney exchange.  Swap ID is [id number].  Recipient center is [facility].  Mahi is donating as an advance voucher donor on behalf of her  Lasha.  He will be activated in ADP as soon as she donates.  I reviewed the SRTR datasheet for the other center's kidney recipient data.  The patient acknowledged understanding of this.    Patient updated her risk behavior questionnaire and answered no to all questions.   I reviewed pain control medication that is typically prescribed including nerve block which Anesthesia will place preop.  I reviewed the ERAS protocol teaching sheet:  Encourage Activity.  Demonstrated and reviewed importance of Incentive Spirometry usage- I gave the patient an IS to bring home to practice and to bring to surgery.   I gave the patient two bottles of Ensure clear with instructions to drink at bedtime the night prior to surgery and again the morning of surgery when the patient awakens.  I reviewed instruction for no solid foods after 10 pm the night before surgery.  I reviewed NPO status including clear liquids begins at 04:30 on the morning of surgery.  I reviewed arrival time of 05:00 to 3C and Start time of surgery 06:30.    Pt will meet with Surgeon and Nurse Practitioner today to develop plan for surgery.  I gave the patient a donor blanket and parking passes.   I thanked the patient for all that she is doing to make this happen.  I reviewed that the donor's bills will be paid for by insurance of the matched recipient.    I reviewed that the patient has a right to withdraw from kidney donation at any time, for any reason.   I reviewed post op plan of care including timing for office visit with surgeon and when labs are needed.  The patient will go home after discharge and then [place] [facility] [place].   We reviewed plan for blood draws post  donation.   I reviewed both the donor and recipient ABO blood types, UNOS ID and provided them to OR Supervisor for the day of surgery ABO verification by the surgical team.  I reviewed the KPD program s remedy for failed KPD exchanges and that the remedy does not include any additional priority for the paired candidate on the  donor waiting list.  I reviewed how the kidneys are transported to the matched recipient's in the exchanges.  I explained that the process is monitored closely, in fact a GPS device will be used but the process does have risk.  I reviewed that the shipped kidney has a risk of getting lost or damaged while being shipped.    I reviewed that the outcome of the patient's matched recipient may be different from the intended recipient's outcome.      I reviewed the KPD program's rules for communication between anonymous pairs.  I stated it is okay to mail a card to our office and I will see that it gets to the recipient, I ask that there be no identifiers on the correspondence.      Logistics call will completed.  I have sent todays results to the other transplant center.  Also all of today s results will be placed in chart for transporting with the organ.    Final XM results are pending.  Serology kit drawn today and shipped to [facility] lab.  [employer] packaging forms have been sent for the 22 start time of 0630.  Gregorio is  Job # [id number].    Order placed for blood to be drawn on admission to hospital for 2 yellow ACD,  1 red tubes.    I have been given clearance to proceed from Financial and Transplant Managers.  The other centers in this exchange have met CMS guidelines for performing kidney transplant as shown on the CMS website.     Patient gave me leave paperwork to complete, I will fax to her employer.  Olya Daniel RN  Living Donor Coordinator  2022 2:21 PM

## 2022-04-13 ENCOUNTER — TELEPHONE (OUTPATIENT)
Dept: TRANSPLANT | Facility: CLINIC | Age: 32
End: 2022-04-13
Payer: COMMERCIAL

## 2022-04-13 LAB
EBV VCA IGG SER IA-ACNC: <10 U/ML
EBV VCA IGG SER IA-ACNC: NORMAL
EBV VCA IGM SER IA-ACNC: <10 U/ML
EBV VCA IGM SER IA-ACNC: NORMAL

## 2022-04-13 NOTE — TELEPHONE ENCOUNTER
"SW called patient for check in as her paired exchange living kidney donation is coming up next week. Mahi describes herself as \"happy\" about the upcoming surgery. She continues to work at [employer] and SW inquired if she had submitted her information to [facility] donor shield-- she has not done this yet, but plans to do it soon. SW offered to assist as needed. Mahi does have multiple minor children at home and states her sister and neighbors will care for them during recovery. Reports that she has a strong support system. No questions or concerns at this time-- SW encouraged her to reach out as needed to donor team.     Marietta López, ZAC, CCTSW   Living Donor   [facility], [facility], [facility]  Direct: [phone]  E-Mail: [email or web address]    "

## 2022-04-14 LAB
BACTERIA UR CULT: ABNORMAL
HBV DNA SERPL QL NAA+PROBE: NORMAL
HCV RNA SERPL QL NAA+PROBE: NORMAL
HIV1+2 RNA SERPL QL NAA+PROBE: NORMAL
WNV RNA SERPL DONR QL NAA+PROBE: NORMAL

## 2022-04-15 DIAGNOSIS — Z00.5 EXAMINATION OF POTENTIAL DONOR OF ORGAN AND TISSUE: Primary | ICD-10-CM

## 2022-04-19 ENCOUNTER — LAB (OUTPATIENT)
Dept: LAB | Facility: CLINIC | Age: 32
End: 2022-04-19
Attending: SURGERY
Payer: COMMERCIAL

## 2022-04-19 ENCOUNTER — OFFICE VISIT (OUTPATIENT)
Dept: TRANSPLANT | Facility: CLINIC | Age: 32
End: 2022-04-19
Attending: SURGERY

## 2022-04-19 ENCOUNTER — DOCUMENTATION ONLY (OUTPATIENT)
Dept: TRANSPLANT | Facility: CLINIC | Age: 32
End: 2022-04-19

## 2022-04-19 ENCOUNTER — ANCILLARY PROCEDURE (OUTPATIENT)
Dept: GENERAL RADIOLOGY | Facility: CLINIC | Age: 32
End: 2022-04-19
Attending: SURGERY
Payer: COMMERCIAL

## 2022-04-19 VITALS
HEART RATE: 63 BPM | DIASTOLIC BLOOD PRESSURE: 83 MMHG | BODY MASS INDEX: 26.84 KG/M2 | OXYGEN SATURATION: 99 % | SYSTOLIC BLOOD PRESSURE: 130 MMHG | WEIGHT: 158.8 LBS

## 2022-04-19 DIAGNOSIS — Z00.5 EXAMINATION OF POTENTIAL DONOR OF ORGAN AND TISSUE: ICD-10-CM

## 2022-04-19 DIAGNOSIS — Z52.4 KIDNEY DONOR: Primary | ICD-10-CM

## 2022-04-19 LAB
ALBUMIN UR-MCNC: NEGATIVE MG/DL
APPEARANCE UR: CLEAR
BACTERIA #/AREA URNS HPF: ABNORMAL /HPF
BILIRUB UR QL STRIP: NEGATIVE
COLOR UR AUTO: YELLOW
GLUCOSE UR STRIP-MCNC: NEGATIVE MG/DL
HGB UR QL STRIP: NEGATIVE
KETONES UR STRIP-MCNC: NEGATIVE MG/DL
LEUKOCYTE ESTERASE UR QL STRIP: NEGATIVE
NITRATE UR QL: POSITIVE
PH UR STRIP: 5 [PH] (ref 5–7)
RBC URINE: <1 /HPF
SARS-COV-2 RNA RESP QL NAA+PROBE: NEGATIVE
SP GR UR STRIP: 1.02 (ref 1–1.03)
SQUAMOUS EPITHELIAL: <1 /HPF
UROBILINOGEN UR STRIP-MCNC: NORMAL MG/DL
WBC URINE: 2 /HPF

## 2022-04-19 PROCEDURE — U0005 INFEC AGEN DETEC AMPLI PROBE: HCPCS | Mod: 90 | Performed by: PATHOLOGY

## 2022-04-19 PROCEDURE — U0003 INFECTIOUS AGENT DETECTION BY NUCLEIC ACID (DNA OR RNA); SEVERE ACUTE RESPIRATORY SYNDROME CORONAVIRUS 2 (SARS-COV-2) (CORONAVIRUS DISEASE [COVID-19]), AMPLIFIED PROBE TECHNIQUE, MAKING USE OF HIGH THROUGHPUT TECHNOLOGIES AS DESCRIBED BY CMS-2020-01-R: HCPCS | Mod: 90 | Performed by: PATHOLOGY

## 2022-04-19 PROCEDURE — 71046 X-RAY EXAM CHEST 2 VIEWS: CPT | Mod: GC | Performed by: RADIOLOGY

## 2022-04-19 PROCEDURE — 87186 SC STD MICRODIL/AGAR DIL: CPT | Performed by: SURGERY

## 2022-04-19 PROCEDURE — 99000 SPECIMEN HANDLING OFFICE-LAB: CPT | Performed by: PATHOLOGY

## 2022-04-19 PROCEDURE — 99215 OFFICE O/P EST HI 40 MIN: CPT | Mod: 57 | Performed by: SURGERY

## 2022-04-19 PROCEDURE — 81001 URINALYSIS AUTO W/SCOPE: CPT | Performed by: SURGERY

## 2022-04-19 ASSESSMENT — PAIN SCALES - GENERAL: PAINLEVEL: NO PAIN (0)

## 2022-04-19 NOTE — LETTER
4/19/2022         RE: Mahi Victor  [address] [place] [address]        Dear Colleague,    Thank you for referring your patient, Mahi Victor, to the [facility] TRANSPL[facility]. Please see a copy of my visit note below.    Transplant Surgery Consult Note    Medical record number: [id number]  YOB: 1990,   Consult requested by the patient for evaluation of kidney donation candidacy.    Assessment and Recommendations: Ms. Victor appears to be a good candidate for kidney donation at this point in the evaluation. The following issues will need to be addressed prior to formal review:    On review of her labs from April 12, 2022 she appears to have a positive UA.  Culture shows ESBL E. Coli.  We will start her on ertapenem and continue this postoperatively.  She will get 1 dose intraoperatively.  We will notify the recipient center to ensure they are comfortable moving forward however I do think we can have her urine sterilized prior to moving forward with transplant.  Consent was obtained.  All questions were answered.  Risks and alternatives were discussed.  To her knowledge she does not have a history of frequent urinary tract infections.  She is currently not having any symptoms.  She says she has never been treated for urinary tract infection.     The majority of our visit today was spent in counseling regarding the medical and surgical risks of kidney donation, the typical luc-and post-operative experience and recovery/return to work pattern.  We also talked about post-op visits and longer term health care maintenance, as well as the implications of having one remaining kidney. This discussion included, but was not limited to rates of complications such as bleeding, infection, need for transfusion, reoperation, other organ injury, future bowel obstruction, incisional hernia, port site pain, varicocele, venous thrombosis, pulmonary embolism, renal failure, and death (3 per  10,000). The patient understands that if end stage renal failure happens that dialysis or transplant would be required.   At the conclusion of the visit, all questions had been answered and Ms. Victor's candidacy for donation will be reviewed at our Multidisciplinary Donor Selection Committee. She will stay3 in contact with the nurse coordinator with any concerns.      Total time: 35 minutes  Counselling time: 30 minutes      Angie Ervin MD FACS  Assistant Professor of Surgery  Director, Living Kidney Donor Program.  ---------------------------------------------------------------------------------------------------    HPI: Mahi Victor is a 31 year old year old female who presents for a kidney donor evaluation.  Patient would like to donate to her  through [facility].      Personal history of:   No    Yes  Cancer:    [x]      []             Comment:     Diabetes   [x]      []  Comment:    Thombosis   [x]      []  Comment:       Hepatitis   [x]      []  Comment:    Tuberculosis   [x]      []  Comment:   Back or neck pain:  [x]      []  Comment:      Kidney stones   [x]      []  Comment:                  Kidney infections  [x]      []  Comment:           Urinary retention  [x]      []            Comment:   Regular NSAID use:  [x]      []            Comment:      Constipation:   [x]      []            Comment:      Voodoo  [x]      []            Comment:      Other:    [x]      []            Comment:         Past Medical History:   Diagnosis Date     Cholestasis 01/06/2015     Past Surgical History:   Procedure Laterality Date     TUBAL LIGATION N/A      Family History   Problem Relation Age of Onset     Hypertension Mother      Breast Cancer Mother      Hypertension Father      Throat cancer Father      Kidney failure Father      No Known Problems Sister      Pacemaker Sister      No Known Problems Sister      No Known Problems Sister      No Known Problems Brother      Hypertension Maternal  Grandmother      Kidney failure Maternal Grandmother      Heart Defect Paternal Grandmother      No Known Problems Son      No Known Problems Daughter      No Known Problems Daughter      No Known Problems Daughter      Social History     Socioeconomic History     Marital status:      Spouse name: Not on file     Number of children: 4     Years of education: Not on file     Highest education level: Not on file   Occupational History     Not on file   Tobacco Use     Smoking status: Former Smoker     Quit date:      Years since quittin.3     Smokeless tobacco: Never Used   Substance and Sexual Activity     Alcohol use: Not Currently     Alcohol/week: 0.0 - 1.0 standard drinks     Drug use: Never     Sexual activity: Not on file   Other Topics Concern     Not on file   Social History Narrative     Not on file     Social Determinants of Health     Financial Resource Strain: Not on file   Food Insecurity: Not on file   Transportation Needs: Not on file   Physical Activity: Not on file   Stress: Not on file   Social Connections: Not on file   Intimate Partner Violence: Not on file   Housing Stability: Not on file       ROS:   CONSTITUTIONAL:  No fevers or chills  EYES: negative for icterus  ENT:  negative for hearing loss, tinnitus and sore throat  RESPIRATORY:  negative for cough, sputum, dyspnea  CARDIOVASCULAR:  negative for chest pain  GASTROINTESTINAL:  negative for nausea, vomiting, diarrhea or constipation  GENITOURINARY:  negative for incontinence, dysuria, bladder emptying problems  HEME:  No easy bruising  INTEGUMENT:  negative for rash and pruritus  NEURO:  Negative for headache, seizure disorder    Allergies:   No Known Allergies    Medications:      Exam:   Pulse:  [63] 63  BP: (130)/(83) 130/83  SpO2:  [99 %] 99 %  Body mass index is 26.84 kg/m .  Pulse:  [63] 63  BP: (130)/(83) 130/83  SpO2:  [99 %] 99 %  Appearance: in no apparent distress.   Skin: normal  Head and Neck: Normal, no rashes  or jaundice  Respiratory: normal respiratory excursions, no audible wheeze  Cardiovascular: RRR  Abdomen: flat, No distention and No surgical scars   Extremeties: Edema, none  Neuro: without deficit       Diagnostics:     Again, thank you for allowing me to participate in the care of your patient.        Sincerely,        Angie Ervin MD

## 2022-04-19 NOTE — PROGRESS NOTES
Met with patient briefly today.  She has UTI and will pickup antibiotic in [facility] and begin dosing immediately with total of two doses today.   I have communicated to other transplant center and they reviewed the urine culture report and will be prophylaxis antibiotic to their recipient on day of surgery.   I will monitor for her covid pcr and chest xray results.  I plan to call the patient with an update tomorrow.  Patient denies any symptoms of UTI.    We repeated the UA/UC today and I will monitor for those results.  Olya Daniel RN  Living Donor Coordinator  04/19/2022 1:56 PM

## 2022-04-19 NOTE — NURSING NOTE
Chief Complaint   Patient presents with     Pre-Op Exam     DM-2 Donor      Blood pressure 130/83, pulse 63, weight 72 kg (158 lb 12.8 oz), SpO2 99 %.    Megan Clancy, CMA

## 2022-04-19 NOTE — PROGRESS NOTES
Transplant Surgery Consult Note    Medical record number: [id number]  YOB: 1990,   Consult requested by the patient for evaluation of kidney donation candidacy.    Assessment and Recommendations: Ms. Victor appears to be a good candidate for kidney donation at this point in the evaluation. The following issues will need to be addressed prior to formal review:    On review of her labs from April 12, 2022 she appears to have a positive UA.  Culture shows ESBL E. Coli.  We will start her on ertapenem and continue this postoperatively.  She will get 1 dose intraoperatively.  We will notify the recipient center to ensure they are comfortable moving forward however I do think we can have her urine sterilized prior to moving forward with transplant.  Consent was obtained.  All questions were answered.  Risks and alternatives were discussed.  To her knowledge she does not have a history of frequent urinary tract infections.  She is currently not having any symptoms.  She says she has never been treated for urinary tract infection.     The majority of our visit today was spent in counseling regarding the medical and surgical risks of kidney donation, the typical luc-and post-operative experience and recovery/return to work pattern.  We also talked about post-op visits and longer term health care maintenance, as well as the implications of having one remaining kidney. This discussion included, but was not limited to rates of complications such as bleeding, infection, need for transfusion, reoperation, other organ injury, future bowel obstruction, incisional hernia, port site pain, varicocele, venous thrombosis, pulmonary embolism, renal failure, and death (3 per 10,000). The patient understands that if end stage renal failure happens that dialysis or transplant would be required.   At the conclusion of the visit, all questions had been answered and Ms. Victor's candidacy for donation will be reviewed at our  Multidisciplinary Donor Selection Committee. She will stay3 in contact with the nurse coordinator with any concerns.      Total time: 35 minutes  Counselling time: 30 minutes      Angie Ervin MD FACS  Assistant Professor of Surgery  Director, Living Kidney Donor Program.  ---------------------------------------------------------------------------------------------------    HPI: Mahi Victor is a 31 year old year old female who presents for a kidney donor evaluation.  Patient would like to donate to her  through [facility].      Personal history of:   No    Yes  Cancer:    [x]      []             Comment:     Diabetes   [x]      []  Comment:    Thombosis   [x]      []  Comment:       Hepatitis   [x]      []  Comment:    Tuberculosis   [x]      []  Comment:   Back or neck pain:  [x]      []  Comment:      Kidney stones   [x]      []  Comment:                  Kidney infections  [x]      []  Comment:           Urinary retention  [x]      []            Comment:   Regular NSAID use:  [x]      []            Comment:      Constipation:   [x]      []            Comment:      Anabaptism  [x]      []            Comment:      Other:    [x]      []            Comment:         Past Medical History:   Diagnosis Date     Cholestasis 01/06/2015     Past Surgical History:   Procedure Laterality Date     TUBAL LIGATION N/A      Family History   Problem Relation Age of Onset     Hypertension Mother      Breast Cancer Mother      Hypertension Father      Throat cancer Father      Kidney failure Father      No Known Problems Sister      Pacemaker Sister      No Known Problems Sister      No Known Problems Sister      No Known Problems Brother      Hypertension Maternal Grandmother      Kidney failure Maternal Grandmother      Heart Defect Paternal Grandmother      No Known Problems Son      No Known Problems Daughter      No Known Problems Daughter      No Known Problems Daughter      Social History      Socioeconomic History     Marital status:      Spouse name: Not on file     Number of children: 4     Years of education: Not on file     Highest education level: Not on file   Occupational History     Not on file   Tobacco Use     Smoking status: Former Smoker     Quit date:      Years since quittin.3     Smokeless tobacco: Never Used   Substance and Sexual Activity     Alcohol use: Not Currently     Alcohol/week: 0.0 - 1.0 standard drinks     Drug use: Never     Sexual activity: Not on file   Other Topics Concern     Not on file   Social History Narrative     Not on file     Social Determinants of Health     Financial Resource Strain: Not on file   Food Insecurity: Not on file   Transportation Needs: Not on file   Physical Activity: Not on file   Stress: Not on file   Social Connections: Not on file   Intimate Partner Violence: Not on file   Housing Stability: Not on file       ROS:   CONSTITUTIONAL:  No fevers or chills  EYES: negative for icterus  ENT:  negative for hearing loss, tinnitus and sore throat  RESPIRATORY:  negative for cough, sputum, dyspnea  CARDIOVASCULAR:  negative for chest pain  GASTROINTESTINAL:  negative for nausea, vomiting, diarrhea or constipation  GENITOURINARY:  negative for incontinence, dysuria, bladder emptying problems  HEME:  No easy bruising  INTEGUMENT:  negative for rash and pruritus  NEURO:  Negative for headache, seizure disorder    Allergies:   No Known Allergies    Medications:      Exam:   Pulse:  [63] 63  BP: (130)/(83) 130/83  SpO2:  [99 %] 99 %  Body mass index is 26.84 kg/m .  Pulse:  [63] 63  BP: (130)/(83) 130/83  SpO2:  [99 %] 99 %  Appearance: in no apparent distress.   Skin: normal  Head and Neck: Normal, no rashes or jaundice  Respiratory: normal respiratory excursions, no audible wheeze  Cardiovascular: RRR  Abdomen: flat, No distention and No surgical scars   Extremeties: Edema, none  Neuro: without deficit       Diagnostics:

## 2022-04-20 ENCOUNTER — TELEPHONE (OUTPATIENT)
Dept: TRANSPLANT | Facility: CLINIC | Age: 32
End: 2022-04-20
Payer: COMMERCIAL

## 2022-04-20 ENCOUNTER — INFUSION THERAPY VISIT (OUTPATIENT)
Dept: INFUSION THERAPY | Facility: CLINIC | Age: 32
End: 2022-04-20
Attending: SURGERY

## 2022-04-20 ENCOUNTER — ANESTHESIA EVENT (OUTPATIENT)
Dept: SURGERY | Facility: CLINIC | Age: 32
DRG: 660 | End: 2022-04-20
Payer: COMMERCIAL

## 2022-04-20 VITALS
HEART RATE: 72 BPM | TEMPERATURE: 97.6 F | DIASTOLIC BLOOD PRESSURE: 81 MMHG | SYSTOLIC BLOOD PRESSURE: 116 MMHG | RESPIRATION RATE: 16 BRPM

## 2022-04-20 DIAGNOSIS — Z00.5 TRANSPLANT DONOR EVALUATION: Primary | ICD-10-CM

## 2022-04-20 DIAGNOSIS — Z00.5 EXAMINATION OF POTENTIAL DONOR OF ORGAN AND TISSUE: Primary | ICD-10-CM

## 2022-04-20 PROCEDURE — 250N000011 HC RX IP 250 OP 636: Performed by: SURGERY

## 2022-04-20 PROCEDURE — 96374 THER/PROPH/DIAG INJ IV PUSH: CPT

## 2022-04-20 PROCEDURE — 250N000009 HC RX 250: Performed by: SURGERY

## 2022-04-20 RX ORDER — ALBUTEROL SULFATE 90 UG/1
1-2 AEROSOL, METERED RESPIRATORY (INHALATION)
Status: CANCELLED
Start: 2022-04-20

## 2022-04-20 RX ORDER — METHYLPREDNISOLONE SODIUM SUCCINATE 125 MG/2ML
125 INJECTION, POWDER, LYOPHILIZED, FOR SOLUTION INTRAMUSCULAR; INTRAVENOUS
Status: CANCELLED
Start: 2022-04-20

## 2022-04-20 RX ORDER — MEPERIDINE HYDROCHLORIDE 25 MG/ML
25 INJECTION INTRAMUSCULAR; INTRAVENOUS; SUBCUTANEOUS EVERY 30 MIN PRN
Status: CANCELLED | OUTPATIENT
Start: 2022-04-20

## 2022-04-20 RX ORDER — ALBUTEROL SULFATE 0.83 MG/ML
2.5 SOLUTION RESPIRATORY (INHALATION)
Status: CANCELLED | OUTPATIENT
Start: 2022-04-20

## 2022-04-20 RX ORDER — NALOXONE HYDROCHLORIDE 0.4 MG/ML
0.2 INJECTION, SOLUTION INTRAMUSCULAR; INTRAVENOUS; SUBCUTANEOUS
Status: CANCELLED | OUTPATIENT
Start: 2022-04-20

## 2022-04-20 RX ORDER — DIPHENHYDRAMINE HYDROCHLORIDE 50 MG/ML
50 INJECTION INTRAMUSCULAR; INTRAVENOUS
Status: CANCELLED
Start: 2022-04-20

## 2022-04-20 RX ORDER — EPINEPHRINE 1 MG/ML
0.3 INJECTION, SOLUTION, CONCENTRATE INTRAVENOUS EVERY 5 MIN PRN
Status: CANCELLED | OUTPATIENT
Start: 2022-04-20

## 2022-04-20 RX ORDER — EPINEPHRINE 1 MG/ML
0.3 INJECTION, SOLUTION INTRAMUSCULAR; SUBCUTANEOUS EVERY 5 MIN PRN
Status: CANCELLED | OUTPATIENT
Start: 2022-04-20

## 2022-04-20 RX ADMIN — ERTAPENEM SODIUM 1 G: 1 INJECTION, POWDER, LYOPHILIZED, FOR SOLUTION INTRAMUSCULAR; INTRAVENOUS at 13:37

## 2022-04-20 ASSESSMENT — PAIN SCALES - GENERAL: PAINLEVEL: NO PAIN (0)

## 2022-04-20 NOTE — ANESTHESIA PREPROCEDURE EVALUATION
Anesthesia Pre-Procedure Evaluation    Patient: Mahi Victor   MRN: [id number] : 1990        Procedure : Procedure(s):  Laparoscopic Hand Assisted Left Kidney Living Unrelated Transplant Donor, Paired Exchange Kidney          Past Medical History:   Diagnosis Date     Cholestasis 2015      Past Surgical History:   Procedure Laterality Date     TUBAL LIGATION N/A       No Known Allergies   Social History     Tobacco Use     Smoking status: Former Smoker     Quit date:      Years since quittin.3     Smokeless tobacco: Never Used   Substance Use Topics     Alcohol use: Not Currently     Alcohol/week: 0.0 - 1.0 standard drinks      Wt Readings from Last 1 Encounters:   22 72 kg (158 lb 12.8 oz)        Anesthesia Evaluation   Pt has had prior anesthetic. Type: General.    No history of anesthetic complications       ROS/MED HX  ENT/Pulmonary:  - neg pulmonary ROS     Neurologic:  - neg neurologic ROS     Cardiovascular:  - neg cardiovascular ROS   (+) -----Previous cardiac testing   Echo: Date: Results:    Stress Test: Date: Results:    ECG Reviewed: Date: 21 Results:  NSR  Cath: Date: Results:      METS/Exercise Tolerance: >4 METS    Hematologic:  - neg hematologic  ROS     Musculoskeletal:  - neg musculoskeletal ROS     GI/Hepatic:  - neg GI/hepatic ROS     Renal/Genitourinary:  - neg Renal ROS     Endo:  - neg endo ROS     Psychiatric/Substance Use:  - neg psychiatric ROS     Infectious Disease:  - neg infectious disease ROS     Malignancy:  - neg malignancy ROS     Other:            Physical Exam    Airway        Mallampati: II   TM distance: > 3 FB   Neck ROM: full   Mouth opening: > 3 cm    Respiratory Devices and Support         Dental         B=Bridge, C=Chipped, L=Loose, M=Missing    Cardiovascular   cardiovascular exam normal          Pulmonary   pulmonary exam normal                OUTSIDE LABS:  CBC:   Lab Results   Component Value Date    WBC 4.2 2021    HGB 13.8  04/12/2022    HGB 13.1 08/26/2021    HCT 38.3 08/26/2021     08/26/2021     BMP:   Lab Results   Component Value Date     08/26/2021    POTASSIUM 4.0 08/26/2021    CHLORIDE 109 08/26/2021    CO2 26 08/26/2021    BUN 18 08/26/2021    CR 0.68 04/12/2022    CR 0.64 08/26/2021     (H) 08/26/2021    GLC 96 10/21/2020     COAGS:   Lab Results   Component Value Date    PTT 27 08/26/2021    INR 1.01 08/26/2021     POC: No results found for: BGM, HCG, HCGS  HEPATIC:   Lab Results   Component Value Date    ALBUMIN 3.7 08/26/2021    PROTTOTAL 7.0 08/26/2021    ALT 27 08/26/2021    AST 14 08/26/2021    ALKPHOS 52 08/26/2021    BILITOTAL 0.5 08/26/2021     OTHER:   Lab Results   Component Value Date    A1C 5.0 08/26/2021    LAURA 9.0 08/26/2021    PHOS 3.6 08/26/2021       Anesthesia Plan    ASA Status:  1      Anesthesia Type: General.     - Airway: ETT   Induction: Intravenous.   Maintenance: Inhalation.   Techniques and Equipment:     - Lines/Monitors: 2nd IV     Consents    Anesthesia Plan(s) and associated risks, benefits, and realistic alternatives discussed. Questions answered and patient/representative(s) expressed understanding.     - Discussed: Risks, Benefits and Alternatives for BOTH SEDATION and the PROCEDURE were discussed     - Discussed with:  Patient      - Extended Intubation/Ventilatory Support Discussed: No.      - Patient is DNR/DNI Status: No    Use of blood products discussed: Yes.     - Discussed with: Patient.     Postoperative Care    Pain management: IV analgesics, Multi-modal analgesia.   PONV prophylaxis: Dexamethasone or Solumedrol, Ondansetron (or other 5HT-3)     Comments:                Samuel Webber MD

## 2022-04-20 NOTE — TELEPHONE ENCOUNTER
I spoke to patient that our team would like her to have infusion of antibiotic today.  She confirmed her availability.  I called her back with time of 1400, I explained location of unit [facility] on second floor.  She continues to deny symptoms of UTI, no fever, no burning or itching.  I have placed therapy plan order for Ertapenem 1gm infusion today.  Dr Crespo consulted with ID and Dr Ervin plan will be another IV infusion tomorrow in hospital and then a 5 day course.  Olya Daniel RN  Living Donor Coordinator  04/20/2022 11:59 AM

## 2022-04-20 NOTE — TELEPHONE ENCOUNTER
I was updated that the time of infusion could be sooner, I called patient and she can be there in 30 minutes.  I will update [facility].  Olya Daniel RN  Living Donor Coordinator  04/20/2022 12:16 PM

## 2022-04-20 NOTE — LETTER
4/20/2022         RE: Mahi Victor  [address] [place] [address]        Dear Colleague,    Thank you for referring your patient, Mahi Victor, to the [facility]. Please see a copy of my visit note below.    Infusion Nursing Note:  Mahi Victor presents today for ertapenum infusion 1 x for UTI prior to kidney donation.    Patient seen by provider today: Yes:    present during visit today: Not Applicable.    Note: pt stayed for observation for 20 minutes post infusion as this was the first dose.      Intravenous Access:  Peripheral IV placed.      Post Infusion Assessment:  Patient tolerated infusion without incident.       Discharge Plan:   Patient and/or family verbalized understanding of discharge instructions and all questions answered.      Shila Catalan RN    Administrations This Visit     ertapenem (INVanz) 1 g in 10 mL SWFI for IVP     Admin Date  04/20/2022 Action  Given Dose  1 g Route  Intravenous Administered By  Shila Catalan RN                                    Again, thank you for allowing me to participate in the care of your patient.        Sincerely,        [facility]

## 2022-04-20 NOTE — TELEPHONE ENCOUNTER
I confirmed with Dr Crespo. Patient should stop the augmentin.  I have called the patient and gave her that instruction.  She stated understanding.  We reviewed the plan for the ensure clear and no solid foods after bedtime, clear liquids only until 04:30 then NPO.  She was in agreement to the plan of care.  She stated the infusion went well.    Olya Daniel RN  Living Donor Coordinator  04/20/2022 3:09 PM

## 2022-04-21 ENCOUNTER — ANCILLARY PROCEDURE (OUTPATIENT)
Dept: ULTRASOUND IMAGING | Facility: CLINIC | Age: 32
End: 2022-04-21
Attending: STUDENT IN AN ORGANIZED HEALTH CARE EDUCATION/TRAINING PROGRAM
Payer: COMMERCIAL

## 2022-04-21 ENCOUNTER — ANESTHESIA (OUTPATIENT)
Dept: SURGERY | Facility: CLINIC | Age: 32
DRG: 660 | End: 2022-04-21
Payer: COMMERCIAL

## 2022-04-21 ENCOUNTER — HOSPITAL ENCOUNTER (INPATIENT)
Facility: CLINIC | Age: 32
LOS: 2 days | Discharge: HOME | DRG: 660 | End: 2022-04-23
Attending: SURGERY | Admitting: SURGERY
Payer: COMMERCIAL

## 2022-04-21 DIAGNOSIS — Z00.5 TRANSPLANT DONOR EVALUATION: ICD-10-CM

## 2022-04-21 DIAGNOSIS — Z52.4 ENCOUNTER FOR DONATION OF KIDNEY: Primary | ICD-10-CM

## 2022-04-21 LAB
CK SERPL-CCNC: 108 U/L (ref 30–225)
ERYTHROCYTE [DISTWIDTH] IN BLOOD BY AUTOMATED COUNT: 11.9 % (ref 10–15)
GLUCOSE BLDC GLUCOMTR-MCNC: 84 MG/DL (ref 70–99)
HCT VFR BLD AUTO: 35.7 % (ref 35–47)
HGB BLD-MCNC: 12.2 G/DL (ref 11.7–15.7)
HOLD SPECIMEN: NORMAL
MCH RBC QN AUTO: 30.6 PG (ref 26.5–33)
MCHC RBC AUTO-ENTMCNC: 34.2 G/DL (ref 31.5–36.5)
MCV RBC AUTO: 90 FL (ref 78–100)
PLATELET # BLD AUTO: 230 10E3/UL (ref 150–450)
RBC # BLD AUTO: 3.99 10E6/UL (ref 3.8–5.2)
WBC # BLD AUTO: 11.6 10E3/UL (ref 4–11)

## 2022-04-21 PROCEDURE — 258N000003 HC RX IP 258 OP 636: Performed by: ANESTHESIOLOGY

## 2022-04-21 PROCEDURE — 36415 COLL VENOUS BLD VENIPUNCTURE: CPT | Performed by: NURSE PRACTITIONER

## 2022-04-21 PROCEDURE — 250N000024 HC ISOFLURANE, PER MIN: Performed by: SURGERY

## 2022-04-21 PROCEDURE — 258N000003 HC RX IP 258 OP 636

## 2022-04-21 PROCEDURE — C9290 INJ, BUPIVACAINE LIPOSOME: HCPCS

## 2022-04-21 PROCEDURE — 250N000011 HC RX IP 250 OP 636: Performed by: STUDENT IN AN ORGANIZED HEALTH CARE EDUCATION/TRAINING PROGRAM

## 2022-04-21 PROCEDURE — 250N000013 HC RX MED GY IP 250 OP 250 PS 637

## 2022-04-21 PROCEDURE — 120N000011 HC R&B TRANSPLANT UMMC

## 2022-04-21 PROCEDURE — 250N000011 HC RX IP 250 OP 636: Performed by: SURGERY

## 2022-04-21 PROCEDURE — 250N000013 HC RX MED GY IP 250 OP 250 PS 637: Performed by: NURSE PRACTITIONER

## 2022-04-21 PROCEDURE — 250N000011 HC RX IP 250 OP 636

## 2022-04-21 PROCEDURE — 99024 POSTOP FOLLOW-UP VISIT: CPT | Performed by: STUDENT IN AN ORGANIZED HEALTH CARE EDUCATION/TRAINING PROGRAM

## 2022-04-21 PROCEDURE — 84520 ASSAY OF UREA NITROGEN: CPT | Performed by: STUDENT IN AN ORGANIZED HEALTH CARE EDUCATION/TRAINING PROGRAM

## 2022-04-21 PROCEDURE — 82565 ASSAY OF CREATININE: CPT | Performed by: STUDENT IN AN ORGANIZED HEALTH CARE EDUCATION/TRAINING PROGRAM

## 2022-04-21 PROCEDURE — 999N000141 HC STATISTIC PRE-PROCEDURE NURSING ASSESSMENT: Performed by: SURGERY

## 2022-04-21 PROCEDURE — 272N000001 HC OR GENERAL SUPPLY STERILE: Performed by: SURGERY

## 2022-04-21 PROCEDURE — 250N000009 HC RX 250

## 2022-04-21 PROCEDURE — 36415 COLL VENOUS BLD VENIPUNCTURE: CPT | Performed by: STUDENT IN AN ORGANIZED HEALTH CARE EDUCATION/TRAINING PROGRAM

## 2022-04-21 PROCEDURE — 250N000013 HC RX MED GY IP 250 OP 250 PS 637: Performed by: STUDENT IN AN ORGANIZED HEALTH CARE EDUCATION/TRAINING PROGRAM

## 2022-04-21 PROCEDURE — 85018 HEMOGLOBIN: CPT | Performed by: STUDENT IN AN ORGANIZED HEALTH CARE EDUCATION/TRAINING PROGRAM

## 2022-04-21 PROCEDURE — 0TT10ZZ RESECTION OF LEFT KIDNEY, OPEN APPROACH: ICD-10-PCS | Performed by: SURGERY

## 2022-04-21 PROCEDURE — 360N000077 HC SURGERY LEVEL 4, PER MIN: Performed by: SURGERY

## 2022-04-21 PROCEDURE — 250N000009 HC RX 250: Performed by: SURGERY

## 2022-04-21 PROCEDURE — 710N000010 HC RECOVERY PHASE 1, LEVEL 2, PER MIN: Performed by: SURGERY

## 2022-04-21 PROCEDURE — 250N000011 HC RX IP 250 OP 636: Performed by: NURSE PRACTITIONER

## 2022-04-21 PROCEDURE — 370N000017 HC ANESTHESIA TECHNICAL FEE, PER MIN: Performed by: SURGERY

## 2022-04-21 PROCEDURE — 82550 ASSAY OF CK (CPK): CPT | Performed by: NURSE PRACTITIONER

## 2022-04-21 PROCEDURE — 85027 COMPLETE CBC AUTOMATED: CPT | Performed by: NURSE PRACTITIONER

## 2022-04-21 PROCEDURE — 88240 CELL CRYOPRESERVE/STORAGE: CPT | Performed by: NURSE PRACTITIONER

## 2022-04-21 RX ORDER — SODIUM CHLORIDE, SODIUM LACTATE, POTASSIUM CHLORIDE, CALCIUM CHLORIDE 600; 310; 30; 20 MG/100ML; MG/100ML; MG/100ML; MG/100ML
INJECTION, SOLUTION INTRAVENOUS CONTINUOUS
Status: DISCONTINUED | OUTPATIENT
Start: 2022-04-21 | End: 2022-04-21 | Stop reason: HOSPADM

## 2022-04-21 RX ORDER — ONDANSETRON 2 MG/ML
INJECTION INTRAMUSCULAR; INTRAVENOUS PRN
Status: DISCONTINUED | OUTPATIENT
Start: 2022-04-21 | End: 2022-04-21

## 2022-04-21 RX ORDER — ONDANSETRON 2 MG/ML
4 INJECTION INTRAMUSCULAR; INTRAVENOUS EVERY 30 MIN PRN
Status: DISCONTINUED | OUTPATIENT
Start: 2022-04-21 | End: 2022-04-21 | Stop reason: HOSPADM

## 2022-04-21 RX ORDER — NALOXONE HYDROCHLORIDE 0.4 MG/ML
0.2 INJECTION, SOLUTION INTRAMUSCULAR; INTRAVENOUS; SUBCUTANEOUS
Status: DISCONTINUED | OUTPATIENT
Start: 2022-04-21 | End: 2022-04-21 | Stop reason: HOSPADM

## 2022-04-21 RX ORDER — ONDANSETRON 2 MG/ML
4 INJECTION INTRAMUSCULAR; INTRAVENOUS EVERY 30 MIN PRN
Status: DISCONTINUED | OUTPATIENT
Start: 2022-04-21 | End: 2022-04-21

## 2022-04-21 RX ORDER — FENTANYL CITRATE 50 UG/ML
50 INJECTION, SOLUTION INTRAMUSCULAR; INTRAVENOUS EVERY 5 MIN PRN
Status: DISCONTINUED | OUTPATIENT
Start: 2022-04-21 | End: 2022-04-21 | Stop reason: HOSPADM

## 2022-04-21 RX ORDER — OXYCODONE HYDROCHLORIDE 5 MG/1
5 TABLET ORAL EVERY 4 HOURS PRN
Status: DISCONTINUED | OUTPATIENT
Start: 2022-04-21 | End: 2022-04-21

## 2022-04-21 RX ORDER — ONDANSETRON 4 MG/1
4 TABLET, ORALLY DISINTEGRATING ORAL EVERY 30 MIN PRN
Status: DISCONTINUED | OUTPATIENT
Start: 2022-04-21 | End: 2022-04-21

## 2022-04-21 RX ORDER — FENTANYL CITRATE 50 UG/ML
25-50 INJECTION, SOLUTION INTRAMUSCULAR; INTRAVENOUS
Status: DISCONTINUED | OUTPATIENT
Start: 2022-04-21 | End: 2022-04-21 | Stop reason: HOSPADM

## 2022-04-21 RX ORDER — ACETAMINOPHEN 500 MG
1000 TABLET ORAL ONCE
Status: COMPLETED | OUTPATIENT
Start: 2022-04-21 | End: 2022-04-21

## 2022-04-21 RX ORDER — NALOXONE HYDROCHLORIDE 0.4 MG/ML
0.4 INJECTION, SOLUTION INTRAMUSCULAR; INTRAVENOUS; SUBCUTANEOUS
Status: DISCONTINUED | OUTPATIENT
Start: 2022-04-21 | End: 2022-04-21 | Stop reason: HOSPADM

## 2022-04-21 RX ORDER — FENTANYL CITRATE 50 UG/ML
INJECTION, SOLUTION INTRAMUSCULAR; INTRAVENOUS PRN
Status: DISCONTINUED | OUTPATIENT
Start: 2022-04-21 | End: 2022-04-21

## 2022-04-21 RX ORDER — HEPARIN SODIUM 5000 [USP'U]/.5ML
5000 INJECTION, SOLUTION INTRAVENOUS; SUBCUTANEOUS ONCE
Status: COMPLETED | OUTPATIENT
Start: 2022-04-21 | End: 2022-04-21

## 2022-04-21 RX ORDER — LABETALOL HYDROCHLORIDE 5 MG/ML
10 INJECTION, SOLUTION INTRAVENOUS
Status: DISCONTINUED | OUTPATIENT
Start: 2022-04-21 | End: 2022-04-21 | Stop reason: HOSPADM

## 2022-04-21 RX ORDER — BUPIVACAINE HYDROCHLORIDE 2.5 MG/ML
INJECTION, SOLUTION EPIDURAL; INFILTRATION; INTRACAUDAL
Status: COMPLETED | OUTPATIENT
Start: 2022-04-21 | End: 2022-04-21

## 2022-04-21 RX ORDER — ONDANSETRON 4 MG/1
4 TABLET, ORALLY DISINTEGRATING ORAL EVERY 6 HOURS PRN
Status: DISCONTINUED | OUTPATIENT
Start: 2022-04-21 | End: 2022-04-23 | Stop reason: HOSPADM

## 2022-04-21 RX ORDER — DEXAMETHASONE SODIUM PHOSPHATE 4 MG/ML
INJECTION, SOLUTION INTRA-ARTICULAR; INTRALESIONAL; INTRAMUSCULAR; INTRAVENOUS; SOFT TISSUE PRN
Status: DISCONTINUED | OUTPATIENT
Start: 2022-04-21 | End: 2022-04-21

## 2022-04-21 RX ORDER — MEPERIDINE HYDROCHLORIDE 25 MG/ML
12.5 INJECTION INTRAMUSCULAR; INTRAVENOUS; SUBCUTANEOUS
Status: DISCONTINUED | OUTPATIENT
Start: 2022-04-21 | End: 2022-04-21

## 2022-04-21 RX ORDER — LABETALOL HYDROCHLORIDE 5 MG/ML
5 INJECTION, SOLUTION INTRAVENOUS
Status: DISCONTINUED | OUTPATIENT
Start: 2022-04-21 | End: 2022-04-21

## 2022-04-21 RX ORDER — ERTAPENEM 1 G/1
1 INJECTION, POWDER, LYOPHILIZED, FOR SOLUTION INTRAMUSCULAR; INTRAVENOUS EVERY 24 HOURS
Status: DISCONTINUED | OUTPATIENT
Start: 2022-04-21 | End: 2022-04-21

## 2022-04-21 RX ORDER — OXYCODONE HYDROCHLORIDE 5 MG/1
5-10 TABLET ORAL
Status: DISCONTINUED | OUTPATIENT
Start: 2022-04-21 | End: 2022-04-23 | Stop reason: HOSPADM

## 2022-04-21 RX ORDER — BISACODYL 10 MG
10 SUPPOSITORY, RECTAL RECTAL DAILY
Status: DISCONTINUED | OUTPATIENT
Start: 2022-04-22 | End: 2022-04-23 | Stop reason: HOSPADM

## 2022-04-21 RX ORDER — SODIUM CHLORIDE, SODIUM LACTATE, POTASSIUM CHLORIDE, CALCIUM CHLORIDE 600; 310; 30; 20 MG/100ML; MG/100ML; MG/100ML; MG/100ML
INJECTION, SOLUTION INTRAVENOUS CONTINUOUS PRN
Status: DISCONTINUED | OUTPATIENT
Start: 2022-04-21 | End: 2022-04-21

## 2022-04-21 RX ORDER — AMOXICILLIN 250 MG
1 CAPSULE ORAL 2 TIMES DAILY
Status: DISCONTINUED | OUTPATIENT
Start: 2022-04-21 | End: 2022-04-23 | Stop reason: HOSPADM

## 2022-04-21 RX ORDER — ALBUMIN, HUMAN INJ 5% 5 %
250 SOLUTION INTRAVENOUS EVERY 10 MIN PRN
Status: DISCONTINUED | OUTPATIENT
Start: 2022-04-21 | End: 2022-04-21 | Stop reason: HOSPADM

## 2022-04-21 RX ORDER — MANNITOL 20 G/100ML
INJECTION, SOLUTION INTRAVENOUS PRN
Status: DISCONTINUED | OUTPATIENT
Start: 2022-04-21 | End: 2022-04-21

## 2022-04-21 RX ORDER — HYDRALAZINE HYDROCHLORIDE 20 MG/ML
2.5-5 INJECTION INTRAMUSCULAR; INTRAVENOUS EVERY 10 MIN PRN
Status: DISCONTINUED | OUTPATIENT
Start: 2022-04-21 | End: 2022-04-21

## 2022-04-21 RX ORDER — PROCHLORPERAZINE MALEATE 5 MG
10 TABLET ORAL EVERY 6 HOURS PRN
Status: DISCONTINUED | OUTPATIENT
Start: 2022-04-21 | End: 2022-04-23 | Stop reason: HOSPADM

## 2022-04-21 RX ORDER — NALOXONE HYDROCHLORIDE 0.4 MG/ML
0.2 INJECTION, SOLUTION INTRAMUSCULAR; INTRAVENOUS; SUBCUTANEOUS
Status: DISCONTINUED | OUTPATIENT
Start: 2022-04-21 | End: 2022-04-23 | Stop reason: HOSPADM

## 2022-04-21 RX ORDER — FENTANYL CITRATE 50 UG/ML
10-20 INJECTION, SOLUTION INTRAMUSCULAR; INTRAVENOUS
Status: DISCONTINUED | OUTPATIENT
Start: 2022-04-21 | End: 2022-04-23 | Stop reason: HOSPADM

## 2022-04-21 RX ORDER — ONDANSETRON 2 MG/ML
4 INJECTION INTRAMUSCULAR; INTRAVENOUS ONCE
Status: DISCONTINUED | OUTPATIENT
Start: 2022-04-21 | End: 2022-04-21

## 2022-04-21 RX ORDER — ONDANSETRON 4 MG/1
4 TABLET, ORALLY DISINTEGRATING ORAL EVERY 6 HOURS PRN
Status: DISCONTINUED | OUTPATIENT
Start: 2022-04-21 | End: 2022-04-21 | Stop reason: HOSPADM

## 2022-04-21 RX ORDER — DEXTROSE, SODIUM CHLORIDE, SODIUM LACTATE, POTASSIUM CHLORIDE, AND CALCIUM CHLORIDE 5; .6; .31; .03; .02 G/100ML; G/100ML; G/100ML; G/100ML; G/100ML
INJECTION, SOLUTION INTRAVENOUS CONTINUOUS
Status: DISCONTINUED | OUTPATIENT
Start: 2022-04-21 | End: 2022-04-23 | Stop reason: HOSPADM

## 2022-04-21 RX ORDER — BUPIVACAINE HYDROCHLORIDE 2.5 MG/ML
INJECTION, SOLUTION EPIDURAL; INFILTRATION; INTRACAUDAL PRN
Status: DISCONTINUED | OUTPATIENT
Start: 2022-04-21 | End: 2022-04-21 | Stop reason: HOSPADM

## 2022-04-21 RX ORDER — ACETAMINOPHEN 325 MG/1
650 TABLET ORAL EVERY 6 HOURS
Status: DISCONTINUED | OUTPATIENT
Start: 2022-04-21 | End: 2022-04-23 | Stop reason: HOSPADM

## 2022-04-21 RX ORDER — ONDANSETRON 2 MG/ML
4 INJECTION INTRAMUSCULAR; INTRAVENOUS ONCE
Status: DISCONTINUED | OUTPATIENT
Start: 2022-04-21 | End: 2022-04-21 | Stop reason: HOSPADM

## 2022-04-21 RX ORDER — KETOROLAC TROMETHAMINE 15 MG/ML
10 INJECTION, SOLUTION INTRAMUSCULAR; INTRAVENOUS EVERY 8 HOURS
Status: COMPLETED | OUTPATIENT
Start: 2022-04-21 | End: 2022-04-23

## 2022-04-21 RX ORDER — GABAPENTIN 300 MG/1
300 CAPSULE ORAL ONCE
Status: COMPLETED | OUTPATIENT
Start: 2022-04-21 | End: 2022-04-21

## 2022-04-21 RX ORDER — LIDOCAINE 40 MG/G
CREAM TOPICAL
Status: DISCONTINUED | OUTPATIENT
Start: 2022-04-21 | End: 2022-04-21 | Stop reason: HOSPADM

## 2022-04-21 RX ORDER — HYDROMORPHONE HCL IN WATER/PF 6 MG/30 ML
0.2 PATIENT CONTROLLED ANALGESIA SYRINGE INTRAVENOUS EVERY 5 MIN PRN
Status: DISCONTINUED | OUTPATIENT
Start: 2022-04-21 | End: 2022-04-21 | Stop reason: HOSPADM

## 2022-04-21 RX ORDER — FAMOTIDINE 20 MG/1
20 TABLET, FILM COATED ORAL DAILY
Status: DISCONTINUED | OUTPATIENT
Start: 2022-04-21 | End: 2022-04-23 | Stop reason: HOSPADM

## 2022-04-21 RX ORDER — ACETAMINOPHEN 325 MG/1
975 TABLET ORAL ONCE
Status: DISCONTINUED | OUTPATIENT
Start: 2022-04-21 | End: 2022-04-21 | Stop reason: HOSPADM

## 2022-04-21 RX ORDER — ONDANSETRON 2 MG/ML
4 INJECTION INTRAMUSCULAR; INTRAVENOUS EVERY 6 HOURS PRN
Status: DISCONTINUED | OUTPATIENT
Start: 2022-04-21 | End: 2022-04-21 | Stop reason: HOSPADM

## 2022-04-21 RX ORDER — DEXAMETHASONE SODIUM PHOSPHATE 4 MG/ML
8 INJECTION, SOLUTION INTRA-ARTICULAR; INTRALESIONAL; INTRAMUSCULAR; INTRAVENOUS; SOFT TISSUE ONCE
Status: DISCONTINUED | OUTPATIENT
Start: 2022-04-21 | End: 2022-04-21 | Stop reason: HOSPADM

## 2022-04-21 RX ORDER — HEPARIN SODIUM 5000 [USP'U]/.5ML
5000 INJECTION, SOLUTION INTRAVENOUS; SUBCUTANEOUS 2 TIMES DAILY
Status: DISCONTINUED | OUTPATIENT
Start: 2022-04-21 | End: 2022-04-23 | Stop reason: HOSPADM

## 2022-04-21 RX ORDER — ONDANSETRON 4 MG/1
4 TABLET, ORALLY DISINTEGRATING ORAL EVERY 30 MIN PRN
Status: DISCONTINUED | OUTPATIENT
Start: 2022-04-21 | End: 2022-04-21 | Stop reason: HOSPADM

## 2022-04-21 RX ORDER — ERTAPENEM 1 G/1
1 INJECTION, POWDER, LYOPHILIZED, FOR SOLUTION INTRAMUSCULAR; INTRAVENOUS EVERY 24 HOURS
Status: DISCONTINUED | OUTPATIENT
Start: 2022-04-21 | End: 2022-04-23 | Stop reason: HOSPADM

## 2022-04-21 RX ORDER — LIDOCAINE HYDROCHLORIDE 20 MG/ML
INJECTION, SOLUTION INFILTRATION; PERINEURAL PRN
Status: DISCONTINUED | OUTPATIENT
Start: 2022-04-21 | End: 2022-04-21

## 2022-04-21 RX ORDER — PROTAMINE SULFATE 10 MG/ML
50 INJECTION, SOLUTION INTRAVENOUS ONCE
Status: COMPLETED | OUTPATIENT
Start: 2022-04-21 | End: 2022-04-21

## 2022-04-21 RX ORDER — KETOROLAC TROMETHAMINE 30 MG/ML
15 INJECTION, SOLUTION INTRAMUSCULAR; INTRAVENOUS ONCE
Status: COMPLETED | OUTPATIENT
Start: 2022-04-21 | End: 2022-04-21

## 2022-04-21 RX ORDER — HEPARIN SODIUM 1000 [USP'U]/ML
5000 INJECTION, SOLUTION INTRAVENOUS; SUBCUTANEOUS ONCE
Status: COMPLETED | OUTPATIENT
Start: 2022-04-21 | End: 2022-04-21

## 2022-04-21 RX ORDER — OXYCODONE HYDROCHLORIDE 5 MG/1
5 TABLET ORAL EVERY 4 HOURS PRN
Status: DISCONTINUED | OUTPATIENT
Start: 2022-04-21 | End: 2022-04-21 | Stop reason: HOSPADM

## 2022-04-21 RX ORDER — FENTANYL CITRATE 50 UG/ML
25 INJECTION, SOLUTION INTRAMUSCULAR; INTRAVENOUS
Status: DISCONTINUED | OUTPATIENT
Start: 2022-04-21 | End: 2022-04-21 | Stop reason: HOSPADM

## 2022-04-21 RX ORDER — MAGNESIUM SULFATE HEPTAHYDRATE 40 MG/ML
2 INJECTION, SOLUTION INTRAVENOUS ONCE
Status: COMPLETED | OUTPATIENT
Start: 2022-04-21 | End: 2022-04-21

## 2022-04-21 RX ORDER — ERTAPENEM 1 G/1
INJECTION, POWDER, LYOPHILIZED, FOR SOLUTION INTRAMUSCULAR; INTRAVENOUS PRN
Status: DISCONTINUED | OUTPATIENT
Start: 2022-04-21 | End: 2022-04-21

## 2022-04-21 RX ORDER — AMOXICILLIN 250 MG
2 CAPSULE ORAL 2 TIMES DAILY
Status: DISCONTINUED | OUTPATIENT
Start: 2022-04-21 | End: 2022-04-23 | Stop reason: HOSPADM

## 2022-04-21 RX ORDER — ALBUTEROL SULFATE 0.83 MG/ML
2.5 SOLUTION RESPIRATORY (INHALATION) EVERY 4 HOURS PRN
Status: DISCONTINUED | OUTPATIENT
Start: 2022-04-21 | End: 2022-04-21

## 2022-04-21 RX ORDER — ONDANSETRON 2 MG/ML
4 INJECTION INTRAMUSCULAR; INTRAVENOUS EVERY 6 HOURS PRN
Status: DISCONTINUED | OUTPATIENT
Start: 2022-04-21 | End: 2022-04-23 | Stop reason: HOSPADM

## 2022-04-21 RX ORDER — PROPOFOL 10 MG/ML
INJECTION, EMULSION INTRAVENOUS PRN
Status: DISCONTINUED | OUTPATIENT
Start: 2022-04-21 | End: 2022-04-21

## 2022-04-21 RX ORDER — HYDROMORPHONE HCL IN WATER/PF 6 MG/30 ML
0.2 PATIENT CONTROLLED ANALGESIA SYRINGE INTRAVENOUS EVERY 10 MIN PRN
Status: DISCONTINUED | OUTPATIENT
Start: 2022-04-21 | End: 2022-04-21

## 2022-04-21 RX ORDER — NALOXONE HYDROCHLORIDE 0.4 MG/ML
0.4 INJECTION, SOLUTION INTRAMUSCULAR; INTRAVENOUS; SUBCUTANEOUS
Status: DISCONTINUED | OUTPATIENT
Start: 2022-04-21 | End: 2022-04-23 | Stop reason: HOSPADM

## 2022-04-21 RX ORDER — HYDRALAZINE HYDROCHLORIDE 20 MG/ML
2.5-5 INJECTION INTRAMUSCULAR; INTRAVENOUS EVERY 10 MIN PRN
Status: DISCONTINUED | OUTPATIENT
Start: 2022-04-21 | End: 2022-04-21 | Stop reason: HOSPADM

## 2022-04-21 RX ORDER — PROCHLORPERAZINE MALEATE 5 MG
10 TABLET ORAL EVERY 6 HOURS PRN
Status: DISCONTINUED | OUTPATIENT
Start: 2022-04-21 | End: 2022-04-21 | Stop reason: HOSPADM

## 2022-04-21 RX ORDER — FENTANYL CITRATE 50 UG/ML
25 INJECTION, SOLUTION INTRAMUSCULAR; INTRAVENOUS EVERY 5 MIN PRN
Status: DISCONTINUED | OUTPATIENT
Start: 2022-04-21 | End: 2022-04-21

## 2022-04-21 RX ORDER — SODIUM CHLORIDE, SODIUM LACTATE, POTASSIUM CHLORIDE, CALCIUM CHLORIDE 600; 310; 30; 20 MG/100ML; MG/100ML; MG/100ML; MG/100ML
1-3 INJECTION, SOLUTION INTRAVENOUS CONTINUOUS
Status: DISCONTINUED | OUTPATIENT
Start: 2022-04-21 | End: 2022-04-21 | Stop reason: HOSPADM

## 2022-04-21 RX ORDER — FLUMAZENIL 0.1 MG/ML
0.2 INJECTION, SOLUTION INTRAVENOUS
Status: DISCONTINUED | OUTPATIENT
Start: 2022-04-21 | End: 2022-04-21 | Stop reason: HOSPADM

## 2022-04-21 RX ORDER — CARDIOPLEG/ORGAN PRESERV NO.1 9-198-2-1
BOTTLE PERFUSION PRN
Status: DISCONTINUED | OUTPATIENT
Start: 2022-04-21 | End: 2022-04-21 | Stop reason: HOSPADM

## 2022-04-21 RX ORDER — PIPERACILLIN SODIUM, TAZOBACTAM SODIUM 3; .375 G/15ML; G/15ML
3.38 INJECTION, POWDER, LYOPHILIZED, FOR SOLUTION INTRAVENOUS ONCE
Status: DISCONTINUED | OUTPATIENT
Start: 2022-04-21 | End: 2022-04-21

## 2022-04-21 RX ORDER — FUROSEMIDE 10 MG/ML
INJECTION INTRAMUSCULAR; INTRAVENOUS PRN
Status: DISCONTINUED | OUTPATIENT
Start: 2022-04-21 | End: 2022-04-21

## 2022-04-21 RX ADMIN — KETOROLAC TROMETHAMINE 10 MG: 15 INJECTION, SOLUTION INTRAMUSCULAR; INTRAVENOUS at 18:37

## 2022-04-21 RX ADMIN — ROCURONIUM BROMIDE 5 MG: 50 INJECTION, SOLUTION INTRAVENOUS at 10:01

## 2022-04-21 RX ADMIN — FUROSEMIDE 10 MG: 10 INJECTION, SOLUTION INTRAVENOUS at 09:28

## 2022-04-21 RX ADMIN — PROPOFOL 20 MG: 10 INJECTION, EMULSION INTRAVENOUS at 10:37

## 2022-04-21 RX ADMIN — PROPOFOL 20 MG: 10 INJECTION, EMULSION INTRAVENOUS at 10:56

## 2022-04-21 RX ADMIN — KETOROLAC TROMETHAMINE 10 MG: 30 INJECTION, SOLUTION INTRAMUSCULAR at 10:25

## 2022-04-21 RX ADMIN — HYDROMORPHONE HYDROCHLORIDE 0.5 MG: 1 INJECTION, SOLUTION INTRAMUSCULAR; INTRAVENOUS; SUBCUTANEOUS at 09:07

## 2022-04-21 RX ADMIN — FAMOTIDINE 20 MG: 20 TABLET ORAL at 17:05

## 2022-04-21 RX ADMIN — HEPARIN SODIUM 5000 UNITS: 1000 INJECTION INTRAVENOUS; SUBCUTANEOUS at 09:54

## 2022-04-21 RX ADMIN — SENNOSIDES AND DOCUSATE SODIUM 1 TABLET: 8.6; 5 TABLET ORAL at 20:12

## 2022-04-21 RX ADMIN — ACETAMINOPHEN 650 MG: 325 TABLET, FILM COATED ORAL at 22:17

## 2022-04-21 RX ADMIN — ROCURONIUM BROMIDE 50 MG: 50 INJECTION, SOLUTION INTRAVENOUS at 07:04

## 2022-04-21 RX ADMIN — BUPIVACAINE HYDROCHLORIDE 20 ML: 2.5 INJECTION, SOLUTION EPIDURAL; INFILTRATION; INTRACAUDAL; PERINEURAL at 06:41

## 2022-04-21 RX ADMIN — ROCURONIUM BROMIDE 20 MG: 50 INJECTION, SOLUTION INTRAVENOUS at 07:57

## 2022-04-21 RX ADMIN — HEPARIN SODIUM 5000 UNITS: 5000 INJECTION, SOLUTION INTRAVENOUS; SUBCUTANEOUS at 17:06

## 2022-04-21 RX ADMIN — SUGAMMADEX 200 MG: 100 INJECTION, SOLUTION INTRAVENOUS at 11:03

## 2022-04-21 RX ADMIN — ROCURONIUM BROMIDE 5 MG: 50 INJECTION, SOLUTION INTRAVENOUS at 10:36

## 2022-04-21 RX ADMIN — PROTAMINE SULFATE 50 MG: 10 INJECTION, SOLUTION INTRAVENOUS at 10:07

## 2022-04-21 RX ADMIN — ONDANSETRON 4 MG: 2 INJECTION INTRAMUSCULAR; INTRAVENOUS at 10:23

## 2022-04-21 RX ADMIN — FENTANYL CITRATE 250 MCG: 50 INJECTION, SOLUTION INTRAMUSCULAR; INTRAVENOUS at 07:03

## 2022-04-21 RX ADMIN — ROCURONIUM BROMIDE 10 MG: 50 INJECTION, SOLUTION INTRAVENOUS at 09:22

## 2022-04-21 RX ADMIN — ACETAMINOPHEN 650 MG: 325 TABLET, FILM COATED ORAL at 17:06

## 2022-04-21 RX ADMIN — GABAPENTIN 300 MG: 300 CAPSULE ORAL at 05:58

## 2022-04-21 RX ADMIN — MAGNESIUM SULFATE HEPTAHYDRATE 2 G: 40 INJECTION, SOLUTION INTRAVENOUS at 09:15

## 2022-04-21 RX ADMIN — ERTAPENEM SODIUM 1 G: 1 INJECTION, POWDER, LYOPHILIZED, FOR SOLUTION INTRAMUSCULAR; INTRAVENOUS at 07:42

## 2022-04-21 RX ADMIN — DEXAMETHASONE SODIUM PHOSPHATE 4 MG: 4 INJECTION, SOLUTION INTRA-ARTICULAR; INTRALESIONAL; INTRAMUSCULAR; INTRAVENOUS; SOFT TISSUE at 07:03

## 2022-04-21 RX ADMIN — MIDAZOLAM 1 MG: 1 INJECTION INTRAMUSCULAR; INTRAVENOUS at 06:24

## 2022-04-21 RX ADMIN — SODIUM CHLORIDE, SODIUM LACTATE, POTASSIUM CHLORIDE, CALCIUM CHLORIDE AND DEXTROSE MONOHYDRATE: 5; 600; 310; 30; 20 INJECTION, SOLUTION INTRAVENOUS at 12:18

## 2022-04-21 RX ADMIN — ACETAMINOPHEN 1000 MG: 500 TABLET ORAL at 05:58

## 2022-04-21 RX ADMIN — SODIUM CHLORIDE, POTASSIUM CHLORIDE, SODIUM LACTATE AND CALCIUM CHLORIDE: 600; 310; 30; 20 INJECTION, SOLUTION INTRAVENOUS at 06:15

## 2022-04-21 RX ADMIN — MANNITOL 12.5 G: 20 INJECTION, SOLUTION INTRAVENOUS at 09:28

## 2022-04-21 RX ADMIN — FENTANYL CITRATE 50 MCG: 50 INJECTION, SOLUTION INTRAMUSCULAR; INTRAVENOUS at 06:24

## 2022-04-21 RX ADMIN — PROPOFOL 20 MG: 10 INJECTION, EMULSION INTRAVENOUS at 11:02

## 2022-04-21 RX ADMIN — PROPOFOL 80 MG: 10 INJECTION, EMULSION INTRAVENOUS at 07:05

## 2022-04-21 RX ADMIN — PROPOFOL 20 MG: 10 INJECTION, EMULSION INTRAVENOUS at 10:47

## 2022-04-21 RX ADMIN — PROPOFOL 20 MG: 10 INJECTION, EMULSION INTRAVENOUS at 10:50

## 2022-04-21 RX ADMIN — SODIUM CHLORIDE, POTASSIUM CHLORIDE, SODIUM LACTATE AND CALCIUM CHLORIDE: 600; 310; 30; 20 INJECTION, SOLUTION INTRAVENOUS at 07:03

## 2022-04-21 RX ADMIN — PROPOFOL 20 MG: 10 INJECTION, EMULSION INTRAVENOUS at 10:40

## 2022-04-21 RX ADMIN — ROCURONIUM BROMIDE 10 MG: 50 INJECTION, SOLUTION INTRAVENOUS at 08:39

## 2022-04-21 RX ADMIN — BUPIVACAINE 20 ML: 13.3 INJECTION, SUSPENSION, LIPOSOMAL INFILTRATION at 06:42

## 2022-04-21 RX ADMIN — HEPARIN SODIUM 5000 UNITS: 5000 INJECTION, SOLUTION INTRAVENOUS; SUBCUTANEOUS at 07:34

## 2022-04-21 RX ADMIN — LIDOCAINE HYDROCHLORIDE 100 MG: 20 INJECTION, SOLUTION INFILTRATION; PERINEURAL at 07:03

## 2022-04-21 ASSESSMENT — ACTIVITIES OF DAILY LIVING (ADL)
ADLS_ACUITY_SCORE: 7
ADLS_ACUITY_SCORE: 3
ADLS_ACUITY_SCORE: 7
ADLS_ACUITY_SCORE: 3
ADLS_ACUITY_SCORE: 7
ADLS_ACUITY_SCORE: 3
ADLS_ACUITY_SCORE: 3
ADLS_ACUITY_SCORE: 7
ADLS_ACUITY_SCORE: 5
ADLS_ACUITY_SCORE: 3
ADLS_ACUITY_SCORE: 7

## 2022-04-21 NOTE — OR NURSING
Lab called upon pt arrival to draw labs in PACU [facility] states someone will be sent.

## 2022-04-21 NOTE — ANESTHESIA CARE TRANSFER NOTE
Patient: Mahi Victor    Procedure: Procedure(s):  Laparoscopic Hand Assisted Left Kidney Living Unrelated Transplant Donor, Paired Exchange Kidney       Diagnosis: Encounter for donation of kidney [Z52.4]  Diagnosis Additional Information: No value filed.    Anesthesia Type:   General     Note:    Oropharynx: oropharynx clear of all foreign objects and spontaneously breathing  Level of Consciousness: drowsy  Oxygen Supplementation: face mask  Level of Supplemental Oxygen (L/min / FiO2): 8  Independent Airway: airway patency satisfactory and stable  Dentition: dentition unchanged  Vital Signs Stable: post-procedure vital signs reviewed and stable  Report to RN Given: handoff report given  Patient transferred to: PACU  Comments: Pt placed on capnography in PACU. Sign out given to RN and PACU resident, Dr. Madrigal.  Handoff Report: Identifed the Patient, Identified the Reponsible Provider, Reviewed the pertinent medical history, Discussed the surgical course, Reviewed Intra-OP anesthesia mangement and issues during anesthesia, Set expectations for post-procedure period and Allowed opportunity for questions and acknowledgement of understanding      Vitals:  Vitals Value Taken Time   /67 04/21/22 1130   Temp     Pulse 93 04/21/22 1144   Resp 23 04/21/22 1144   SpO2 99 % 04/21/22 1144   Vitals shown include unvalidated device data.    Electronically Signed By: Samuel Webber MD  April 21, 2022  11:45 AM

## 2022-04-21 NOTE — ANESTHESIA PROCEDURE NOTES
Airway       Patient location during procedure: OR       Procedure Start/Stop Times: 4/21/2022 7:06 AM  Staff -        Anesthesiologist:  Issa Neal MD       Resident/Fellow: Samuel Webber MD       Performed By: resident  Consent for Airway        Urgency: elective  Indications and Patient Condition       Indications for airway management: luc-procedural       Induction type:intravenous       Mask difficulty assessment: 1 - vent by mask    Final Airway Details       Final airway type: endotracheal airway       Successful airway: ETT - single  Endotracheal Airway Details        ETT size (mm): 7.0       Cuffed: yes       Successful intubation technique: direct laryngoscopy       DL Blade Type: MAC 3       Grade View of Cords: 2       Position: Center       Measured from: gums/teeth       Secured at (cm): 21    Post intubation assessment        Number of attempts at approach: 1       Number of other approaches attempted: 0       Secured with: silk tape       Ease of procedure: easy       Dentition: Intact and Unchanged    Medication(s) Administered   Medication Administration Time: 4/21/2022 7:06 AM

## 2022-04-21 NOTE — ANESTHESIA POSTPROCEDURE EVALUATION
Patient: Mahi Victor    Procedure: Procedure(s):  Laparoscopic Hand Assisted Left Kidney Living Unrelated Transplant Donor, Paired Exchange Kidney       Anesthesia Type:  General    Note:  Disposition: Inpatient   Postop Pain Control: Uneventful            Sign Out: Well controlled pain   PONV: No   Neuro/Psych: Uneventful            Sign Out: Acceptable/Baseline neuro status   Airway/Respiratory: Uneventful            Sign Out: Acceptable/Baseline resp. status   CV/Hemodynamics: Uneventful            Sign Out: Acceptable CV status; No obvious hypovolemia; No obvious fluid overload   Other NRE: NONE   DID A NON-ROUTINE EVENT OCCUR? No           Last vitals:  Vitals Value Taken Time   /63 04/21/22 1300   Temp 36.8  C (98.2  F) 04/21/22 1245   Pulse 93 04/21/22 1311   Resp 16 04/21/22 1300   SpO2 99 % 04/21/22 1313   Vitals shown include unvalidated device data.    Electronically Signed By: Rigoberto Madrigal MD  April 21, 2022  1:14 PM

## 2022-04-21 NOTE — ANESTHESIA PROCEDURE NOTES
TAP Procedure Note    Pre-Procedure   Staff -        Anesthesiologist:  Butch Peraza MD       Resident/Fellow: Samuel Webber MD       Performed By: resident       Location: pre-op       Pre-Anesthestic Checklist: patient identified, IV checked, site marked, risks and benefits discussed, informed consent, monitors and equipment checked, pre-op evaluation, at physician/surgeon's request and post-op pain management  Timeout:       Correct Patient: Yes        Correct Procedure: Yes        Correct Site: Yes        Correct Position: Yes        Correct Laterality: Yes        Site Marked: Yes  Procedure Documentation  Procedure: TAP       Diagnosis: POST OPERATIVE PAIN       Laterality: bilateral       Patient Position: supine       Patient Prep/Sterile Barriers: sterile gloves, mask       Skin prep: Chloraprep       Needle Type: short bevel       Needle Gauge: 21.        Needle Length (millimeters): 110        Ultrasound guided       1. Ultrasound was used to identify targeted nerve, plexus, vascular marker, or fascial plane and place a needle adjacent to it in real-time.       2. Ultrasound was used to visualize the spread of anesthetic in close proximity to the above referenced structure.       3. A permanent image is entered into the patient's record.    Assessment/Narrative         The placement was negative for: blood aspirated, painful injection and site bleeding       Paresthesias: No.       Bolus given via needle..        Secured via.        Insertion/Infusion Method: Single Shot       Complications: none       Injection made incrementally with aspirations every 5 mL.    Medication(s) Administered   Bupivacaine 0.25% PF (Infiltration) - Infiltration   20 mL - 4/21/2022 6:41:00 AM  Bupivacaine liposome (Exparel) 1.3% LA inj susp (Infiltration) - Infiltration   20 mL - 4/21/2022 6:42:00 AM

## 2022-04-21 NOTE — PROGRESS NOTES
Admitted/transferred from: PACU  Time of arrival on unit 5113  2 RN full  skin assessment completed by EYAD Sandhu and MARYJANE Frost  Skin assessment finding: skin intact, no problems   Interventions/actions: skin interventions Standard incision care     Will continue to monitor.

## 2022-04-21 NOTE — INTERVAL H&P NOTE
"I have reviewed the surgical (or preoperative) H&P that is linked to this encounter, and examined the patient. There are no significant changes    Clinical Conditions Present on Arrival:  Clinically Significant Risk Factors Present on Admission                   # Overweight: Estimated body mass index is 26.83 kg/m  as calculated from the following:    Height as of this encounter: 1.638 m (5' 4.5\").    Weight as of this encounter: 72 kg (158 lb 11.7 oz).       "

## 2022-04-21 NOTE — BRIEF OP NOTE
[facility]    Brief Operative Note    Pre-operative diagnosis: Encounter for donation of kidney [Z52.4]  Post-operative diagnosis Same as pre-operative diagnosis    Procedure: Procedure(s):  Laparoscopic Hand Assisted Left Kidney Living Unrelated Transplant Donor, Paired Exchange Kidney  Surgeon: Surgeon(s) and Role:     * Angie Ervin MD - Primary     * Zachary Maria MD - Fellow - Assisting  Anesthesia: Combined General with Block   Estimated Blood Loss: 50 mL from 4/21/2022  6:50 AM to 4/21/2022 11:30 AM      Drains: None  Specimens: * No specimens in log *  Findings:   kidney donation; single artery, single vein.  Complications: None.  Implants: * No implants in log *

## 2022-04-21 NOTE — PROGRESS NOTES
"Post Op Check    04/21/2022    Mahi Victor is a 31 year old female with h/o cholestasis now POD#0 s/p left donor nephrectomy.    Pt reports pain adequately controlled. Denies SOB, chest pain. Not yet gotten OOB. No nausea.    /65   Pulse 72   Temp 98.2  F (36.8  C) (Oral)   Resp 16   Ht 1.638 m (5' 4.5\")   Wt 72 kg (158 lb 11.7 oz)   LMP 04/14/2022   SpO2 98%   BMI 26.83 kg/m      Gen: A&O x3, NAD, sleepy   Chest: breathing non-labored   Abdomen: soft, non-tender, non-distended   Incision: surgical dressing in place. clean, dry, intact, binder in place  : Elias with clear yellow/dilute urine  Extremities: warm and well perfused     A/P:  No acute post-op issues. Continue plan of care.  Pain: controlled: Received a TAP. Continue Tylenol 650 Q6, Toradol 10 TID, fentanyl IV PRN, oxy 5-10 Q3 PRN      Tayla Ferro, NP  Transplant surgery, #[id number]      "

## 2022-04-21 NOTE — PROGRESS NOTES
Infusion Nursing Note:  Mahi Victor presents today for ertapenum infusion 1 x for UTI prior to kidney donation.    Patient seen by provider today: Yes:    present during visit today: Not Applicable.    Note: pt stayed for observation for 20 minutes post infusion as this was the first dose.      Intravenous Access:  Peripheral IV placed.      Post Infusion Assessment:  Patient tolerated infusion without incident.       Discharge Plan:   Patient and/or family verbalized understanding of discharge instructions and all questions answered.      Shila Catalan RN    Administrations This Visit     ertapenem (INVanz) 1 g in 10 mL SWFI for IVP     Admin Date  04/20/2022 Action  Given Dose  1 g Route  Intravenous Administered By  Shila Catalan RN

## 2022-04-22 ENCOUNTER — DOCUMENTATION ONLY (OUTPATIENT)
Dept: TRANSPLANT | Facility: CLINIC | Age: 32
End: 2022-04-22
Payer: COMMERCIAL

## 2022-04-22 LAB
ALBUMIN UR-MCNC: 30 MG/DL
APPEARANCE UR: CLEAR
BACTERIA UR CULT: ABNORMAL
BILIRUB UR QL STRIP: NEGATIVE
BUN SERPL-MCNC: 14 MG/DL (ref 7–30)
CK SERPL-CCNC: 478 U/L (ref 30–225)
COLOR UR AUTO: YELLOW
CREAT SERPL-MCNC: 1.04 MG/DL (ref 0.52–1.04)
GFR SERPL CREATININE-BSD FRML MDRD: 72 ML/MIN/1.73M2
GLUCOSE UR STRIP-MCNC: NEGATIVE MG/DL
HCT VFR BLD AUTO: 31.8 % (ref 35–47)
HGB BLD-MCNC: 11.1 G/DL (ref 11.7–15.7)
HGB UR QL STRIP: NEGATIVE
KETONES UR STRIP-MCNC: NEGATIVE MG/DL
LEUKOCYTE ESTERASE UR QL STRIP: NEGATIVE
MUCOUS THREADS #/AREA URNS LPF: PRESENT /LPF
NITRATE UR QL: NEGATIVE
PH UR STRIP: 6 [PH] (ref 5–7)
RBC URINE: <1 /HPF
SP GR UR STRIP: 1.02 (ref 1–1.03)
UROBILINOGEN UR STRIP-MCNC: 4 MG/DL
WBC URINE: 5 /HPF

## 2022-04-22 PROCEDURE — 120N000011 HC R&B TRANSPLANT UMMC

## 2022-04-22 PROCEDURE — 87086 URINE CULTURE/COLONY COUNT: CPT | Performed by: NURSE PRACTITIONER

## 2022-04-22 PROCEDURE — 250N000011 HC RX IP 250 OP 636: Performed by: STUDENT IN AN ORGANIZED HEALTH CARE EDUCATION/TRAINING PROGRAM

## 2022-04-22 PROCEDURE — 81001 URINALYSIS AUTO W/SCOPE: CPT | Performed by: STUDENT IN AN ORGANIZED HEALTH CARE EDUCATION/TRAINING PROGRAM

## 2022-04-22 PROCEDURE — 250N000013 HC RX MED GY IP 250 OP 250 PS 637: Performed by: STUDENT IN AN ORGANIZED HEALTH CARE EDUCATION/TRAINING PROGRAM

## 2022-04-22 PROCEDURE — 250N000013 HC RX MED GY IP 250 OP 250 PS 637: Performed by: SURGERY

## 2022-04-22 PROCEDURE — 82550 ASSAY OF CK (CPK): CPT | Performed by: STUDENT IN AN ORGANIZED HEALTH CARE EDUCATION/TRAINING PROGRAM

## 2022-04-22 PROCEDURE — 36415 COLL VENOUS BLD VENIPUNCTURE: CPT | Performed by: STUDENT IN AN ORGANIZED HEALTH CARE EDUCATION/TRAINING PROGRAM

## 2022-04-22 RX ORDER — METHOCARBAMOL 500 MG/1
500 TABLET, FILM COATED ORAL 3 TIMES DAILY
Status: DISCONTINUED | OUTPATIENT
Start: 2022-04-22 | End: 2022-04-23 | Stop reason: HOSPADM

## 2022-04-22 RX ORDER — FAMOTIDINE 20 MG/1
20 TABLET, FILM COATED ORAL DAILY
Qty: 28 TABLET | Refills: 0 | Status: SHIPPED | OUTPATIENT
Start: 2022-04-23

## 2022-04-22 RX ORDER — ACETAMINOPHEN 325 MG/1
650 TABLET ORAL EVERY 6 HOURS PRN
Qty: 100 TABLET | Refills: 0 | Status: SHIPPED | OUTPATIENT
Start: 2022-04-22

## 2022-04-22 RX ORDER — METHOCARBAMOL 500 MG/1
500 TABLET, FILM COATED ORAL 3 TIMES DAILY
Status: DISCONTINUED | OUTPATIENT
Start: 2022-04-22 | End: 2022-04-22

## 2022-04-22 RX ORDER — AMOXICILLIN 250 MG
1 CAPSULE ORAL 2 TIMES DAILY
Qty: 30 TABLET | Refills: 0 | Status: SHIPPED | OUTPATIENT
Start: 2022-04-22

## 2022-04-22 RX ORDER — ONDANSETRON 4 MG/1
4 TABLET, ORALLY DISINTEGRATING ORAL EVERY 6 HOURS PRN
Qty: 6 TABLET | Refills: 0 | Status: SHIPPED | OUTPATIENT
Start: 2022-04-22

## 2022-04-22 RX ORDER — METHOCARBAMOL 500 MG/1
500 TABLET, FILM COATED ORAL 3 TIMES DAILY
Qty: 20 TABLET | Refills: 0 | Status: SHIPPED | OUTPATIENT
Start: 2022-04-22

## 2022-04-22 RX ORDER — OXYCODONE HYDROCHLORIDE 5 MG/1
5-10 TABLET ORAL EVERY 6 HOURS PRN
Qty: 15 TABLET | Refills: 0 | Status: SHIPPED | OUTPATIENT
Start: 2022-04-22

## 2022-04-22 RX ADMIN — ACETAMINOPHEN 650 MG: 325 TABLET, FILM COATED ORAL at 09:24

## 2022-04-22 RX ADMIN — KETOROLAC TROMETHAMINE 10 MG: 15 INJECTION, SOLUTION INTRAMUSCULAR; INTRAVENOUS at 12:10

## 2022-04-22 RX ADMIN — SENNOSIDES AND DOCUSATE SODIUM 2 TABLET: 8.6; 5 TABLET ORAL at 08:02

## 2022-04-22 RX ADMIN — ACETAMINOPHEN 650 MG: 325 TABLET, FILM COATED ORAL at 21:39

## 2022-04-22 RX ADMIN — METHOCARBAMOL 500 MG: 500 TABLET ORAL at 12:09

## 2022-04-22 RX ADMIN — SENNOSIDES AND DOCUSATE SODIUM 2 TABLET: 8.6; 5 TABLET ORAL at 20:25

## 2022-04-22 RX ADMIN — ERTAPENEM SODIUM 1 G: 1 INJECTION, POWDER, LYOPHILIZED, FOR SOLUTION INTRAMUSCULAR; INTRAVENOUS at 07:55

## 2022-04-22 RX ADMIN — HEPARIN SODIUM 5000 UNITS: 5000 INJECTION, SOLUTION INTRAVENOUS; SUBCUTANEOUS at 01:31

## 2022-04-22 RX ADMIN — HEPARIN SODIUM 5000 UNITS: 5000 INJECTION, SOLUTION INTRAVENOUS; SUBCUTANEOUS at 20:25

## 2022-04-22 RX ADMIN — SODIUM CHLORIDE, SODIUM LACTATE, POTASSIUM CHLORIDE, CALCIUM CHLORIDE AND DEXTROSE MONOHYDRATE: 5; 600; 310; 30; 20 INJECTION, SOLUTION INTRAVENOUS at 18:31

## 2022-04-22 RX ADMIN — KETOROLAC TROMETHAMINE 10 MG: 15 INJECTION, SOLUTION INTRAMUSCULAR; INTRAVENOUS at 01:32

## 2022-04-22 RX ADMIN — HEPARIN SODIUM 5000 UNITS: 5000 INJECTION, SOLUTION INTRAVENOUS; SUBCUTANEOUS at 08:09

## 2022-04-22 RX ADMIN — METHOCARBAMOL 500 MG: 500 TABLET ORAL at 20:25

## 2022-04-22 RX ADMIN — BISACODYL 10 MG: 10 SUPPOSITORY RECTAL at 13:45

## 2022-04-22 RX ADMIN — KETOROLAC TROMETHAMINE 10 MG: 15 INJECTION, SOLUTION INTRAMUSCULAR; INTRAVENOUS at 18:24

## 2022-04-22 RX ADMIN — ACETAMINOPHEN 650 MG: 325 TABLET, FILM COATED ORAL at 16:06

## 2022-04-22 RX ADMIN — SODIUM CHLORIDE, SODIUM LACTATE, POTASSIUM CHLORIDE, CALCIUM CHLORIDE AND DEXTROSE MONOHYDRATE: 5; 600; 310; 30; 20 INJECTION, SOLUTION INTRAVENOUS at 02:04

## 2022-04-22 RX ADMIN — FAMOTIDINE 20 MG: 20 TABLET ORAL at 08:02

## 2022-04-22 RX ADMIN — OXYCODONE HYDROCHLORIDE 5 MG: 5 TABLET ORAL at 09:24

## 2022-04-22 ASSESSMENT — ACTIVITIES OF DAILY LIVING (ADL)
ADLS_ACUITY_SCORE: 9
ADLS_ACUITY_SCORE: 7
ADLS_ACUITY_SCORE: 7
ADLS_ACUITY_SCORE: 9
ADLS_ACUITY_SCORE: 7
ADLS_ACUITY_SCORE: 9
ADLS_ACUITY_SCORE: 7
ADLS_ACUITY_SCORE: 9
ADLS_ACUITY_SCORE: 7

## 2022-04-22 NOTE — PLAN OF CARE
"Goal Outcome Evaluation:  /58 (BP Location: Left arm)   Pulse 80   Temp 97.7  F (36.5  C) (Oral)   Resp 16   Ht 1.638 m (5' 4.5\")   Wt 72 kg (158 lb 11.7 oz)   LMP 04/14/2022   SpO2 97%   BMI 26.83 kg/m      Shift: Arrival on unit at [phone]  Isolation Status: Contact - ESBL  VS: WDL on 2 LPM O2 via NC, afebrile  Neuro: Aox4  Behaviors: Pleasant, cooperative with cares, able to make her needs known  BG: NA  Labs: No new labs  Respiratory: Clear lung sounds, no shortness of breath.  IS used independently  Cardiac: Regular rhythm/rate  Pain/Nausea/PRN: Ongoing mild pain.  No Nausea.  No PRNs  Diet: Regular diet, good appetite  LDA: RUE PIV x2, lawrence catheter  Infusion(s): D5LR at 60 mL/hour  GI/: No BM, not yet passing gas. Lawrence in place drained 375 mL clear light yellow urine.  Skin: Incision covered by operative dressing.  Scant drainage marked in PACU  Mobility: Standby assist.  Ambulated in hallway  Events/Education: Arrived on unit.  Increasing alertness through shift  Plan: Evening dose of subcutaneous heparin to be given at 1200 due to 1200 heparin not given until after patient arrived on unit.                      "

## 2022-04-22 NOTE — PLAN OF CARE
"/58 (BP Location: Left arm)   Pulse 79   Temp 98.8  F (37.1  C) (Oral)   Resp 16   Ht 1.638 m (5' 4.5\")   Wt 72 kg (158 lb 11.7 oz)   LMP 04/14/2022   SpO2 97%   BMI 26.83 kg/m      VSS, on room air. Denies pain/nausea. Elias with 200 ml output.  PIV infusing D5LR. Abdominal incision covered. No visible drainage. Not passing gas and no BM this shift. Regular diet, good appetite. Will continue to monitor.     "

## 2022-04-22 NOTE — CONSULTS
Transplant Admission Psychosocial Assessment    Patient Name: Mahi Victor  : 1990  Age: 31 year old  MRN: [id number]  Date of Initial Social Work Evaluation:     LIVING DONOR SOCIAL WORK NOTE:  D:  Mahi is a living kidney donor, POD 1.  I:  I met with Dilia and her  at bedside to thank her for her donation and to assess for any psychosocial needs and to assist with discharge planning.  I assessed the patient's mood/affect, plans for recovery, and any feelings of regret or remorse regarding donation.   A:  Mahi is resting/up walking on the unit/in their bed. She appears to be in a pleasant mood and affect is congruent. Mahi states that pain is well controlled.  She was a preemptive NKR donor for her spouse who is hoping to be transplanted in the next few months. Patient describes donation recovery as smooth so far.  She and I discussed how to reach me should she have any post operative psychosocial needs. Dilia reports no feelings of regret or remorse about donation.  She denies any discharge planning needs at this time.  Patient plans to discharge to home with the care and support of her family.   P: Donor  will remain available to assist with any psychosocial and advocacy needs, both inpatient and outpatient, as needed.  Patient is aware of follow-up recommendations and has my contact information.        Presenting Information   Living Situation: lives with spouse and 4 kids   If not local, plans for short term stay:  n/a  Previous Functional Status: independent   Cultural/Language/Spiritual Considerations: none     Support System  Primary Support Person:    Other support:  Other relatives   Plan for support in immediate post-transplant period: home with family     Health Care Directive  Decision Maker:   Alternate Decision Maker: unknown  Health Care Directive: Patient considering completing    Mental Health/Coping:   History of Mental Health: Denies  History  of Chemical Health: Denies  Current status: appropriate   Coping: appropriate   Services Needed/Recommended: none     Financial   Income: wages from [employer]  Impact of transplant on income: will rely on NKR  Insurance and medication coverage: n/a  Financial concerns: will need close monitoring   Resources needed: SW assistance       Assessment and recommendations and plan:  SW to remain available as needed post discharge. Plans to go home tomorrow.     Marietta López [facility], [facility]   Living Donor   [facility], [facility], [facility]  Direct: [phone]  E-Mail: [email or web address]

## 2022-04-22 NOTE — PROGRESS NOTES
Transplant Surgery  Inpatient Daily Progress Note  2022    Ms. Victor is Post-operative day #1 s/p laparoscopic left donor nephrectomy. Issues Overnight: None     Patient Vitals for the past 24 hrs:   BP Temp Temp src Pulse Resp SpO2   22 0629 120/75 98.5  F (36.9  C) -- 79 16 96 %   22 0142 106/58 98.8  F (37.1  C) Oral 79 16 97 %   22 0100 -- -- -- -- 16 --   22 2158 103/58 97.7  F (36.5  C) Oral 80 16 97 %   22 1747 110/63 98.1  F (36.7  C) Oral 73 16 98 %   22 1553 113/70 98.4  F (36.9  C) Oral 87 16 98 %   22 1530 120/69 -- -- 83 16 99 %   22 1500 112/68 -- -- 71 16 99 %   22 1430 105/65 -- -- 69 15 97 %   22 1400 107/65 -- -- 72 16 98 %   22 1330 105/63 -- -- 75 15 96 %   22 1300 104/63 -- -- 74 16 96 %   22 1245 109/67 98.2  F (36.8  C) Oral 78 16 95 %   22 1230 109/71 -- -- 75 16 96 %   22 1215 107/65 -- -- 72 15 96 %   22 1200 107/69 98.2  F (36.8  C) Oral 83 19 100 %   22 1145 102/70 -- -- 93 23 99 %   22 1130 113/67 96.8  F (36  C) Oral 77 18 100 %       Pain: Visual Analog Score: 9  Nausea:    [x]    None      []    Some, but not needing medication    []    Yes, needing medication      []    Dry Retching    []    Vomited    DREAMS Performance:    DRinking: adequate po intake   Eating: tolerating regular diet without nausea   Analgesia: Pain not adequately controlled.    Mobilizing: Ambulated x2   Slept: well    Passed flatus? Yes , minimal   Incision(s): C/D/I with no luc-incisional ecchymoses  Abdomen: mild distention, appropriately tender, soft  Extremities: no cyanosis or edema     Allergies:   No Known Allergies    Medications:  Prescription Medications as of 2022       Rx Number Disp Refills Start End Last Dispensed Date Next Fill Date Owning Pharmacy    amoxicillin-clavulanate (AUGMENTIN) 875-125 MG tablet    2022        Si tablet by Oral or Feeding Tube route 2 times  daily    Class: Historical    Route: Oral or Feeding Tube      Hospital Medications as of 4/22/2022       Dose Frequency Start End    acetaminophen (TYLENOL) tablet 650 mg 650 mg EVERY 6 HOURS 4/21/2022 4/23/2022    Admin Instructions: Administer for multimodal surgical pain management.  Pharmacy to time the next dose 8 hours from PRE OP dose.  Maximum acetaminophen dose from all sources = 75 mg/kg/day not to exceed 4 grams/day.    Class: E-Prescribe    Route: Oral    bisacodyl (DULCOLAX) Suppository 10 mg 10 mg DAILY 4/22/2022     Admin Instructions: Start POD 1, then daily until BM. Hold for loose stools.  Hold for loose stools.    Class: E-Prescribe    Route: Rectal    dextrose 5% in lactated ringers infusion  CONTINUOUS 4/21/2022     Admin Instructions: Heplock when oral intake greater than 500 mL.    Class: E-Prescribe    Route: Intravenous    ertapenem (INVanz) 1 g vial to attach to  mL bag 1 g EVERY 24 HOURS 4/21/2022     Admin Instructions: Infuse over 30 minutes.    Class: E-Prescribe    Route: Intravenous    famotidine (PEPCID) tablet 20 mg 20 mg DAILY 4/21/2022     Class: E-Prescribe    Route: Oral    fentaNYL (PF) (SUBLIMAZE) injection 10-20 mcg 10-20 mcg EVERY 1 HOUR PRN 4/21/2022     Admin Instructions: For Severe breakthrough pain  Start at the lowest dose. May adjust dose by 5 mcg every 1 hour as needed. Maximum individual dose is 20 mcg.    Route: Intravenous    heparin (porcine) injection 5,000 Units (Completed) 5,000 Units ONCE 4/21/2022 4/21/2022    Admin Instructions: At direction of Surgeon, administer heparin intra-operatively 3 minutes before renal artery is clamped.    Class: E-Prescribe    Route: Intravenous    heparin ANTICOAGULANT injection 5,000 Units (Completed) 5,000 Units ONCE 4/21/2022 4/21/2022    Admin Instructions: High concentration HEParin. Not for line flush or cath care.  High concentration heparin. Not for line flush or cath care.    Class: E-Prescribe    Route:  Subcutaneous    heparin ANTICOAGULANT injection 5,000 Units 5,000 Units 2 TIMES DAILY 4/21/2022     Admin Instructions: High concentration HEParin. Not for line flush or cath care.  High concentration heparin. Not for line flush or cath care.    Class: E-Prescribe    Route: Subcutaneous    ketorolac (TORADOL) injection 10 mg 10 mg EVERY 8 HOURS 4/21/2022 4/23/2022    Admin Instructions: Pharmacy to time the first dose based on the timing of the INTRA-OP dose.  Can cause pain on injection.  If ordered intravenously (IV) : administer through a running maintenance fluid over 1 minute followed by a flush.  If patient complains of pain on injection, may dilute 15-30 mg in 5 mL and push over 1 to 2 minutes.      Class: E-Prescribe    Route: Intravenous    ketorolac (TORADOL) injection 15 mg (Completed) 15 mg ONCE 4/21/2022 4/21/2022    Admin Instructions: Injection to be available for Operating surgeon to administer intra-operatively.  Can cause pain on injection.  If ordered intravenously (IV) : administer through a running maintenance fluid over 1 minute followed by a flush.  If patient complains of pain on injection, may dilute 15-30 mg in 5 mL and push over 1 to 2 minutes.      Class: E-Prescribe    Route: Intravenous    magnesium sulfate 2 g in water intermittent infusion (Completed) 2 g ONCE 4/21/2022 4/21/2022    Admin Instructions: Administer post-induction and PRIOR to incision.    Class: E-Prescribe    Route: Intravenous    naloxone (NARCAN) injection 0.2 mg 0.2 mg EVERY 2 MIN PRN 4/21/2022     Admin Instructions: Administer intravenous route when available and notify provider when administered.  For unintended sedation or respiratory depression if all of the below criteria are met:  ~ respiratory rate LESS than or EQUAL to 8.   ~SaO2 less than 92% and or/end-tidal CO2 is greater than 50.  ~ the patient is receiving an opioid, has unintended sedations assessed as RASS (-3), and is currently not on mechanical  "ventilation.  RASS scale moderate (-3) is movement or eye opening to voice but no eye contact.    Patient Monitoring  Once the patient has demonstrated a response to the naloxone, continue to monitor respiratory rate, depth, oxygen saturation and end-tidal CO2 (if available) every 15 minutes x 2, then every 30 minutes x 2, then every 1 hour x 1 after each naloxone dose.  Consider transfer to ICU if patient respiratory parameters have not improved after 4 naloxone doses.    Class: E-Prescribe    Route: Intravenous    Linked Group 1: \"Or\" Linked Group Details        naloxone (NARCAN) injection 0.2 mg 0.2 mg EVERY 2 MIN PRN 4/21/2022     Admin Instructions: Administer intramuscular if an intravenous route is not available and notify provider when administered.  For unintended sedation or respiratory depression if all of the below criteria are met:  ~ respiratory rate LESS than or EQUAL to 8.   ~SaO2 less than 92% and or/end-tidal CO2 is greater than 50.  ~ the patient is receiving an opioid, has unintended sedations assessed as RASS (-3), and is currently not on mechanical ventilation.  RASS scale moderate (-3) is movement or eye opening to voice but no eye contact.    Patient Monitoring  Once the patient has demonstrated a response to the naloxone, continue to monitor respiratory rate, depth, oxygen saturation and end-tidal CO2 (if available) every 15 minutes x 2, then every 30 minutes x 2, then every 1 hour x 1 after each naloxone dose.  Consider transfer to ICU if patient respiratory parameters have not improved after 4 naloxone doses.    Class: E-Prescribe    Route: Intramuscular    Linked Group 1: \"Or\" Linked Group Details        naloxone (NARCAN) injection 0.4 mg 0.4 mg EVERY 2 MIN PRN 4/21/2022     Admin Instructions: Administer intravenous route when available and notify provider when administered.  For unintended sedation or respiratory depression if all of the below criteria are met:  ~ respiratory rate LESS " "than or EQUAL to 8.  ~ SaO2 less than 92% and or/end-tidal CO2 is greater than 50.  ~ the patient is receiving an opioid, has unintended sedation assessed as RASS (-4) or (-5) and patient is currently not on mechanical ventilation.  RASS scale (-4) is deep sedation with no response to voice but movement or eye opening to physical stimulation.  RASS scale (-5) is unarousable.      Patient Monitoring  Once the patient has demonstrated a response to the naloxone, continue to monitor respiratory rate, depth, oxygen saturation and end-tidal CO2 (if available) every 15 minutes x 2, then every 30 minutes x 2, then every 1 hour x 1 after each naloxone dose.  Consider transfer to ICU if patient respiratory parameters have not improved after 4 naloxone doses.    Class: E-Prescribe    Route: Intravenous    Linked Group 1: \"Or\" Linked Group Details        naloxone (NARCAN) injection 0.4 mg 0.4 mg EVERY 2 MIN PRN 4/21/2022     Admin Instructions: Administer intramuscular if an intravenous route is not available and notify provider when administered.  For unintended sedation or respiratory depression if all of the below criteria are met:  ~ respiratory rate LESS than or EQUAL to 8.  ~ SaO2 less than 92% and or/end-tidal CO2 is greater than 50.  ~ the patient is receiving an opioid, has unintended sedation assessed as RASS (-4) or (-5) and patient is currently not on mechanical ventilation.  RASS scale (-4) is deep sedation with no response to voice but movement or eye opening to physical stimulation.  RASS scale (-5) is unarousable.      Patient Monitoring  Once the patient has demonstrated a response to the naloxone, continue to monitor respiratory rate, depth, oxygen saturation and end-tidal CO2 (if available) every 15 minutes x 2, then every 30 minutes x 2, then every 1 hour x 1 after each naloxone dose.  Consider transfer to ICU if patient respiratory parameters have not improved after 4 naloxone doses.    Class: E-Prescribe " "   Route: Intramuscular    Linked Group 1: \"Or\" Linked Group Details        ondansetron (ZOFRAN) injection 4 mg 4 mg EVERY 6 HOURS PRN 4/21/2022     Admin Instructions: This is Step 1 of nausea and vomiting management.  If nausea not resolved in 15 minutes, go to Step 2 prochlorperazine (COMPAZINE).  Irritant.    Class: E-Prescribe    Route: Intravenous    Linked Group 2: \"Or\" Linked Group Details        ondansetron (ZOFRAN-ODT) ODT tab 4 mg 4 mg EVERY 6 HOURS PRN 4/21/2022     Admin Instructions: This is Step 1 of nausea and vomiting management.  If nausea not resolved in 15 minutes, go to Step 2 prochlorperazine (COMPAZINE). Do not push through foil backing. Peel back foil and gently remove. Place on tongue immediately. Administration with liquid unnecessary  With dry hands, peel back foil backing and gently remove tablet. Do not push oral disintegrating tablet through foil backing. Administer immediately on tongue and oral disintegrating tablet dissolves in seconds, then swallow with saliva. Liquid not required.    Class: E-Prescribe    Route: Oral    Linked Group 2: \"Or\" Linked Group Details        oxyCODONE (ROXICODONE) tablet 5-10 mg 5-10 mg EVERY 3 HOURS PRN 4/21/2022     Admin Instructions: ~ Start at the lowest dose.  ~ May adjust dose by 5 mg every 3 hours to optimize patient mobilization and comfort as needed.  Maximum individual dose is 10 mg.  ~ Hold dose for analgesic side effects.    ~ Notify provider to assess patient for uncontrolled pain or analgesic side effects.   ~ Maximum total is 80 mg in 24 hours.    Route: Oral    prochlorperazine (COMPAZINE) injection 10 mg 10 mg EVERY 6 HOURS PRN 4/21/2022     Admin Instructions: This is Step 2 of nausea and vomiting management.   If nausea not resolved in 15-30 minutes, Notify provider.    Class: E-Prescribe    Route: Intravenous    Linked Group 3: \"Or\" Linked Group Details        prochlorperazine (COMPAZINE) tablet 10 mg 10 mg EVERY 6 HOURS PRN 4/21/2022 " "    Admin Instructions: This is Step 2 of nausea and vomiting management.   If nausea not resolved in 15-30 minutes, Notify provider.    Class: E-Prescribe    Route: Oral    Linked Group 3: \"Or\" Linked Group Details        protamine injection 50 mg (Completed) 50 mg ONCE 4/21/2022 4/21/2022    Admin Instructions: At direction of Surgeon, administer protamine intra-operatively.  Give slowly once renal artery is divided.   1 ml protamine (10 mg/mL) for every 1 ml heparin (1000 units/mL)    Class: E-Prescribe    Route: Intravenous    senna-docusate (SENOKOT-S/PERICOLACE) 8.6-50 MG per tablet 1 tablet 1 tablet 2 TIMES DAILY 4/21/2022     Admin Instructions: If no bowel movement in 24 hours, increase to 2 tablets PO.  Hold for loose stools.  Hold for loose stools.    Class: E-Prescribe    Route: Oral    Linked Group 4: \"Or\" Linked Group Details        senna-docusate (SENOKOT-S/PERICOLACE) 8.6-50 MG per tablet 2 tablet 2 tablet 2 TIMES DAILY 4/21/2022     Admin Instructions: Hold for loose stools.  Hold for loose stools.    Class: E-Prescribe    Route: Oral    Linked Group 4: \"Or\" Linked Group Details              Labs:   Latest Reference Range & Units 04/21/22 12:50 04/21/22 23:36 04/22/22 05:00 04/22/22 06:55   Urea Nitrogen 7 - 30 mg/dL  14     Creatinine 0.52 - 1.04 mg/dL  1.04     GFR Estimate >60 mL/min/1.73m2  72 [1]     CK Total 30 - 225 U/L 108   478 (H)   WBC 4.0 - 11.0 10e3/uL 11.6 (H)      Hemoglobin 11.7 - 15.7 g/dL 12.2 11.1 (L)     Hematocrit 35.0 - 47.0 % 35.7 31.8 (L)     Platelet Count 150 - 450 10e3/uL 230      RBC Count 3.80 - 5.20 10e6/uL 3.99      MCV 78 - 100 fL 90      MCH 26.5 - 33.0 pg 30.6      MCHC 31.5 - 36.5 g/dL 34.2      RDW 10.0 - 15.0 % 11.9      Color Urine Colorless, Straw, Light Yellow, Yellow    Yellow    Appearance Urine Clear    Clear    Glucose Urine Negative mg/dL   Negative    Bilirubin Urine Negative    Negative    Ketones Urine Negative mg/dL   Negative    Specific Gravity " Urine 1.003 - 1.035    1.025    PH Urine 5.0 - 7.0    6.0    Protein Albumin Urine Negative mg/dL   30  !    Urobilinogen mg/dL Normal, 2.0 mg/dL   4.0 !    Nitrite Urine Negative    Negative    Blood Urine Negative    Negative    Leukocyte Esterase Urine Negative    Negative    WBC Urine <=5 /HPF   5    RBC Urine <=2 /HPF   <1    Mucus Urine None Seen /LPF   Present !         Assessment: Post-operative day #1, doing fairly well.     UTI    Plan:   -Encouraged ambulation and ISB   -Continue GI and DVT prophylaxis, dulcolax suppository today  -Pain control: Scheduled acetaminophen. Encourage oral analgesia prn. Start Robaxin.  -Remove lawrence  -Discontinue IV fluid with adequate po  -Continue Ertapenem for EColi UTI    Discharge planning: Tomorrow if adequate pain control, return of bowel function    Tayla Ferro NP  Division of Transplantation  Pager [phone]

## 2022-04-22 NOTE — DISCHARGE SUMMARY
[facility]    Discharge Summary  Solid Organ Transplant Surgery    Date of Admission:  4/21/2022  Date of Discharge:  4/23/2022  Date of Service (when I saw the patient): 04/23/22    Discharge Diagnoses   Active Problems:    Encounter for donation of kidney    Acute cystitis without hematuria    ESBL (extended spectrum beta-lactamase) producing bacteria infection      Procedure/Surgery Information   Procedure: Procedure(s):  Laparoscopic Hand Assisted Left Kidney Living Unrelated Transplant Donor, Paired Exchange Kidney   Surgeon(s): Surgeon(s) and Role:     * Angie Ervin MD - Primary     * Zachary Maria MD - Fellow - Assisting             History of Present Illness   Mahi Victor is a 31 year old female who presented for nephrectomy for kidney donation.    Hospital Course   Mahi Victor was admitted on 4/21/2022 and underwent nephrectomy for kidney donation. Tolerating oral diet, ambulating independently, voiding without retention, and pain controlled by day of discharge.    Post-operative pain control included Toradol, Tylenol, and Oxycodone PRN and will be Oxycodone and Tylenol PRN on discharge.  The patient was instructed to avoid NSAID medications    UTI: >100K ESBL EColi: Treated with Ertapenem 4/20-4/23. Repeat UA and culture obtained on 4/22 were negative. She will have a repeat UA and culture obtained in 1 week. She was provided education on reducing risk of recurrent UTI.      Tayla Ferro    .    Discharge Disposition   Discharged to home   Condition at discharge: Good    Primary Care Physician   Buddy Butts    Physical Exam   Temp: 97.8  F (36.6  C) Temp src: Oral BP: 115/80 Pulse: 76   Resp: 16 SpO2: 97 % O2 Device: None (Room air)    Vitals:    04/21/22 0537 04/22/22 1013 04/23/22 0851   Weight: 72 kg (158 lb 11.7 oz) 75.1 kg (165 lb 8 oz) 75.1 kg (165 lb 8 oz)     Vital Signs with Ranges  Temp:  [97.8  F (36.6  C)-98.6  F (37  " C)] 97.8  F (36.6  C)  Pulse:  [70-87] 76  Resp:  [16] 16  BP: (114-124)/(74-89) 115/80  SpO2:  [94 %-98 %] 97 %  I/O last 3 completed shifts:  In: 1500 [P.O.:1400; I.V.:100]  Out: 1950 [Urine:1950]    Gen: A&O x3, NAD   Chest: breathing non-labored   Abdomen: soft, non-tender, non-distended   Incisions: with steri strips clean, dry, intact, binder in place  : voiding without retention, no gross hematuria  Extremities: warm and well perfused        Consultations This Hospital Stay   PHARMACY IP CONSULT  SOT MEDICATION HISTORY IP PHARMACY CONSULT  CARE MANAGEMENT / SOCIAL WORK IP CONSULT    Time Spent on this Encounter   I have spent greater than 30 minutes on this discharge.    Discharge Orders      Reason for your hospital stay    Kidney donation surgery  Urinary tract infection     Activity    Don't lift anything heavier than 10 pounds for 6 weeks (or longer if your surgeon has discussed this with you).  Walk regularly.    OK to drive only after no longer taking narcotics AND you feel comfortable working the breaks/clutch suddenly if needed.  Limit sports and strenuous activities/'core' exercises for 6 weeks.  If you experience pain after exertion, try an ice pack or warm pack to the area.   Wear abdominal binder for comfort if you desire.    You have been instructed to avoid NSAIDs (medications such as ibuprofen, \"Motrin\", naproxen, diclofenac) as these medications may affect the remaining kidney. Take other medications as prescribed.    Keep narcotics out of reach of children. When finished with them, please destroy/discard any remaining pills responsibly. Often, your [facility] government office, local police station or pharmacy will have a drug deposit box.     When to contact your care team    Your transplant coordinator if you have any of the following:   Swelling, oozing, worsening pain, unusual redness around the incision  Fever of 101 F or higher   Increasing abdominal pain   Nausea or vomiting   Severe " diarrhea, bloating, or constipation     Any concerns or questions, please call your Transplant coordinator:    Phone: [phone].  If they are not available, the on call coordinator/MD will help you with your concern.  If you are unable to reach a coordinator and have an urgent medical questions, please call the hospital at [phone] and ask to have the Pancreas Transplant Surgery fellow on-call paged.     Wound care and dressings    If your incisions have GLUE, gently wash with soap and water, but do not scrub. Do not soak in a bath until the glue has naturally fallen off and any openings are closed (at least 2 weeks).    If your incisions have STERI STRIPS, leave steri strips in place until they curl and peel off. Please don't shower until 48 hours after surgery. Then gently wash with soap and water, but do not scrub them. Do not soak in a bath until the strips have naturally fallen off and any openings are closed (at least 2 weeks).     Adult [facility] Follow-up and recommended labs and tests    Repeat Urinalysis and urine culture in 1 week.  Follow up with Dr. Ervin in Transplant Clinic in 2-3 weeks.  If you need to change your appointment please contact your coordinator at [phone].     Appointments on [place] and/or [facility] (with [facility] or [facility] provider or service). Call [phone] if you haven't heard regarding these appointments within 7 days of discharge.     Diet    Diet recommendations post-transplant:   Orders Placed This Encounter      Regular Diet Adult      Heart healthy dietary habits long term (low saturated/trans fat, low sodium). High protein diet x 8 weeks. Practice food safety precautions.     Discharge Medications   Current Discharge Medication List      START taking these medications    Details   acetaminophen (TYLENOL) 325 MG tablet Take 2 tablets (650 mg) by mouth every 6 hours as needed for mild pain  Qty: 100 tablet, Refills: 0    Associated Diagnoses: Encounter  for donation of kidney      famotidine (PEPCID) 20 MG tablet Take 1 tablet (20 mg) by mouth daily  Qty: 28 tablet, Refills: 0    Associated Diagnoses: Encounter for donation of kidney      methocarbamol (ROBAXIN) 500 MG tablet Take 1 tablet (500 mg) by mouth 3 times daily  Qty: 20 tablet, Refills: 0    Associated Diagnoses: Encounter for donation of kidney      ondansetron (ZOFRAN-ODT) 4 MG ODT tab Take 1 tablet (4 mg) by mouth every 6 hours as needed for nausea or vomiting  Qty: 6 tablet, Refills: 0    Associated Diagnoses: Encounter for donation of kidney      oxyCODONE (ROXICODONE) 5 MG tablet Take 1-2 tablets (5-10 mg) by mouth every 6 hours as needed for moderate to severe pain  Qty: 15 tablet, Refills: 0    Associated Diagnoses: Encounter for donation of kidney      senna-docusate (SENOKOT-S/PERICOLACE) 8.6-50 MG tablet Take 1 tablet by mouth 2 times daily  Qty: 30 tablet, Refills: 0    Associated Diagnoses: Encounter for donation of kidney         STOP taking these medications       amoxicillin-clavulanate (AUGMENTIN) 875-125 MG tablet Comments:   Reason for Stopping:             Allergies   No Known Allergies  Data   Results for orders placed or performed in visit on 04/19/22   XR Chest 2 Views    Narrative    EXAM: XR CHEST 2 VW  4/19/2022 8:36 AM     HISTORY:  Examination of potential donor of organ and tissue       COMPARISON:  8/26/2021    FINDINGS:   Trachea is midline. Cardiomediastinal silhouette is within normal  limits. No focal air space opacities, pleural effusion, or  pneumothorax is appreciated. Visualized upper abdomen is unremarkable.  No acute osseous abnormalities.      Impression    IMPRESSION:   No acute airspace disease.    I have personally reviewed the examination and initial interpretation  and I agree with the findings.    BRANDY RUBIO MD         SYSTEM ID:  [id number]     Most Recent 3 CBC's:  Recent Labs   Lab Test 04/21/22  2336 04/21/22  1250 04/12/22  1013 08/26/21  0750   WBC   --  11.6*  --  4.2   HGB 11.1* 12.2 13.8 13.1   MCV  --  90  --  90   PLT  --  230  --  276     Most Recent 6 Bacteria Isolates From Any Culture (See EPIC Reports for Culture Details):No lab results found.  Most Recent Urinalysis:  Recent Labs   Lab Test 04/22/22  0500   COLOR Yellow   APPEARANCE Clear   URINEGLC Negative   URINEBILI Negative   URINEKETONE Negative   SG 1.025   UBLD Negative   URINEPH 6.0   PROTEIN 30 *   NITRITE Negative   LEUKEST Negative   RBCU <1   WBCU 5     Collected Updated Procedure Result Status    04/22/2022 0500 04/24/2022 1005 Urine Culture [[id number]]   Urine, Elias Catheter    Final result Component Value   Culture No Growth             04/19/2022 1045 04/22/2022 0948 Urine Culture [[id number]]    (Abnormal)   Urine, Midstream    Final result Component Value   Culture >100,000 CFU/mL Escherichia coli ESBL Abnormal          Susceptibility     Escherichia coli ESBL     JOHN     Ampicillin >=32.0 ug/mL Resistant     Cefazolin >=64.0 ug/mL Resistant 1     Cefepime  Resistant     Cefoxitin <=4.0 ug/mL Susceptible     Ceftazidime  Resistant     Ceftriaxone  Resistant     Ciprofloxacin >=4.0 ug/mL Resistant     Gentamicin <=1.0 ug/mL Susceptible     Levofloxacin >=8.0 ug/mL Resistant     Meropenem <=0.25 ug/mL Susceptible 2     Nitrofurantoin 128.0 ug/mL Resistant     Piperacillin/Tazobactam <=4.0 ug/mL Susceptible     Tobramycin <=1.0 ug/mL Susceptible     Trimethoprim/Sulfamethoxazole <=1/19 ug/mL Susceptible

## 2022-04-22 NOTE — PHARMACY-CONSULT NOTE
D/I: 31 year old female s/p kidney donation surgery on 4/21/2022.  Medications have been reviewed by the pharmacist for efficacy, appropriate dose, medication interactions and potential adverse effects.      A: Medications reviewed for this patient as above.   Urine culture from 4/12/2022 and 4/19/2022 positive for > 100K ESBL E.coli. Per clinic notes patient did not have any symptoms of UTI (dysuria, increased frequency, painful urination). She received a dose of ertapenem 1000mg IV in clinic on 4/20/2022 and in OR on 4/21/2022. Ertapenem has been continued post-donation.  P: Pharmacy will continue to monitor for any potential medication issues, and will make recommendations as appropriate. Medication therapy needs for discharge planning will continue to be addressed throughout the current admission via multidisciplinary rounds and order review.    Radha Aiken, Pharm.D., [facility]  Pager [phone]

## 2022-04-22 NOTE — PLAN OF CARE
Goal Outcome Evaluation:    VS:  Afebrile, VSS with RA.  Capnography discontinued.  LABS:  Labs drawn and resulted.  UC added on to UA sent this AM.  NEURO:  Alert, oriented x4.  No deficits present on assessment.  Using call light appropriately.  PAIN:  Incisional pain controlled with scheduled acetaminophen, Toradol, Robaxin.  Given prn oxycodone x1.  Encouraged to use prn oxycodone to keep pain controlled for activity participation.  DIET:  Regular diet.  Eating well, frequently food brought from outside.  GI:  Passing small amounts of flatus, suppository given.  Encouraged activity and adequate hydration to promote bowel function.  :  Elias had 250cc prior to removal.  Void x1, 300cc and first PVR negative.  Continue to encourage adequate po fluid intake to maintain hydration and UOP.  SKIN:  Midline incision and lap sites with op dressings on.  Adbominal binder on at all times, loose while in bed.    ACTIVITY:  Up to chair x2.  Ambulated in room frequently and in halls x1.  LINE:  Two PIVs in R-hand, MIV continued until po intake increased.  Tolerating intermittent antibiotic infusion without issue.  EDUCATION:  Reviewed pain management, incentive spirometer use, adbominal precautions for getting in/out of bed, bowel promotion, adequate fluid intake.  PLAN:  Continue current plan of care.  Update NP or cross-cover following Donor service prn any change in pt status or other concerns.  Likely to discharge to home tomorrow.

## 2022-04-22 NOTE — PROGRESS NOTES
I visited the patient in the 7A room.  The patient was laying in bed awake. Her  was with her. The patient reports better pain control now and denies nausea.    The patient had lawrence catheter removed and has urinated without difficulty.  The patient reports going for walks.  She plans to get up this late afternoon again to walk.  The patient  has not passed much gas.  The patient is able to drink liquid and has eaten small amounts of food.  Discharge plan: tomorrow.  I gave the patient the education sheet of the milestones for discharge and things to expect after donation during her preop visit.  Organ specific education completed, and discharge planning has been conducted with multidisciplinary transplant care team including physicians, pharmacy, nutrition, and social work.   I update her that the recipient at [facility] is doing well.  I reviewed with the patient how to reach at on call coordinator if she needs assistance once she is discharged.  I encouraged her to reach out with any concerns.  She stated understanding and is agreement to the POC.  I will plan to follow up with the patient on Monday with a telephone call.  Olya Daniel RN  Living Donor Coordinator  04/22/2022 4:21 PM

## 2022-04-22 NOTE — PHARMACY-ADMISSION MEDICATION HISTORY
Admission Medication History Completed by Pharmacy    See [facility] Admission Navigator for allergy information, preferred outpatient pharmacy, prior to admission medications and immunization status.     Medication History Sources:     Spoke with patient via telephone    Reviewed dispense history    Changes made to PTA medication list (reason):    Added: None    Deleted: None    Changed: None    Additional Information:    Mahi does not take vitamins, supplements or any topical OTC products.    Prior to Admission medications    Medication Sig Last Dose Taking? Auth Provider   amoxicillin-clavulanate (AUGMENTIN) 875-125 MG tablet 1 tablet by Oral or Feeding Tube route 2 times daily 4/20/2022 at 0900 Yes Reported, Patient       Date completed: 04/22/22    Medication history completed by: Radha Aiken, Pharm.D., [facility]  Pager [phone]

## 2022-04-22 NOTE — PROGRESS NOTES
INDEPENDENT LIVING DONOR ADVOCATE NOTE:  D:  Mahi is a living kidney donor, POD #1.  I:  I met with her at bedside to thank her for her donation and to assess for any GERMANIA needs.  I assessed the patient's mood/affect, plans for recovery, and any feelings of regret/remorse regarding donation.  We reviewed the importance of completing follow-up labs and surveys at six months, 1 year and 2 years after donation to monitor kidney health and the impact donation has had on their life post donation.   A:  Mahi is resting comfortably/sitting up/walking the unit.  She appears to be in a sad mood and affect is congruent.  She states that pain is well controlled, but is just feeling emotional.  She states that she is happy this is all happening and she can't wait for her , who is her intended paired exchange recipient, to receive his living kidney donor transplant in the very near future.  She denies depression symptoms.  She states she is simply feeling emotional due to pain and bloating and the overwhelming nature of this surgery and what it means to her family.  Her  is on his way here to visit her once he gets the kids off to school.  He and I discussed how to reach me should she have any post operative GERMANIA needs.  She reports no feelings of regret or remorse about donation.  She denies any discharge planning needs at this time.  Patient plans to discharge to home with the care and support of her family.   P: GERMANIA will remain available to assist with any support and GERMANIA needs, both inpatient and outpatient, as needed.  Patient is aware of follow-up recommendations and has my contact information.     HONORIO Zhang, [facility]  Independent Living Donor Advocate  [facility], [facility], [facility]  Pager:  [phone]  Direct:  [phone] Cell Phone  E-Mail:  [email or web address]

## 2022-04-23 VITALS
HEART RATE: 76 BPM | TEMPERATURE: 97.8 F | OXYGEN SATURATION: 97 % | RESPIRATION RATE: 16 BRPM | DIASTOLIC BLOOD PRESSURE: 80 MMHG | HEIGHT: 65 IN | WEIGHT: 165.5 LBS | SYSTOLIC BLOOD PRESSURE: 115 MMHG | BODY MASS INDEX: 27.57 KG/M2

## 2022-04-23 PROCEDURE — 250N000013 HC RX MED GY IP 250 OP 250 PS 637: Performed by: SURGERY

## 2022-04-23 PROCEDURE — 250N000013 HC RX MED GY IP 250 OP 250 PS 637: Performed by: STUDENT IN AN ORGANIZED HEALTH CARE EDUCATION/TRAINING PROGRAM

## 2022-04-23 PROCEDURE — 250N000011 HC RX IP 250 OP 636: Performed by: STUDENT IN AN ORGANIZED HEALTH CARE EDUCATION/TRAINING PROGRAM

## 2022-04-23 PROCEDURE — 99024 POSTOP FOLLOW-UP VISIT: CPT | Mod: GC | Performed by: SURGERY

## 2022-04-23 RX ADMIN — ERTAPENEM SODIUM 1 G: 1 INJECTION, POWDER, LYOPHILIZED, FOR SOLUTION INTRAMUSCULAR; INTRAVENOUS at 08:43

## 2022-04-23 RX ADMIN — SENNOSIDES AND DOCUSATE SODIUM 2 TABLET: 8.6; 5 TABLET ORAL at 08:33

## 2022-04-23 RX ADMIN — METHOCARBAMOL 500 MG: 500 TABLET ORAL at 08:33

## 2022-04-23 RX ADMIN — BISACODYL 10 MG: 10 SUPPOSITORY RECTAL at 08:35

## 2022-04-23 RX ADMIN — METHOCARBAMOL 500 MG: 500 TABLET ORAL at 03:06

## 2022-04-23 RX ADMIN — KETOROLAC TROMETHAMINE 10 MG: 15 INJECTION, SOLUTION INTRAMUSCULAR; INTRAVENOUS at 02:45

## 2022-04-23 RX ADMIN — FAMOTIDINE 20 MG: 20 TABLET ORAL at 08:33

## 2022-04-23 RX ADMIN — ACETAMINOPHEN 650 MG: 325 TABLET, FILM COATED ORAL at 02:45

## 2022-04-23 RX ADMIN — ACETAMINOPHEN 650 MG: 325 TABLET, FILM COATED ORAL at 08:33

## 2022-04-23 RX ADMIN — HEPARIN SODIUM 5000 UNITS: 5000 INJECTION, SOLUTION INTRAVENOUS; SUBCUTANEOUS at 08:34

## 2022-04-23 ASSESSMENT — ACTIVITIES OF DAILY LIVING (ADL)
ADLS_ACUITY_SCORE: 5
ADLS_ACUITY_SCORE: 5
ADLS_ACUITY_SCORE: 7
ADLS_ACUITY_SCORE: 7
ADLS_ACUITY_SCORE: 6
ADLS_ACUITY_SCORE: 7
ADLS_ACUITY_SCORE: 5
ADLS_ACUITY_SCORE: 7
ADLS_ACUITY_SCORE: 5
ADLS_ACUITY_SCORE: 5
ADLS_ACUITY_SCORE: 6
ADLS_ACUITY_SCORE: 5

## 2022-04-23 NOTE — PLAN OF CARE
"Goal Outcome Evaluation:    Plan of Care Reviewed With: patient     Overall Patient Progress: improving    VS: /79 (BP Location: Left arm)   Pulse 78   Temp 98.4  F (36.9  C) (Oral)   Resp 16   Ht 1.638 m (5' 4.5\")   Wt 75.1 kg (165 lb 8 oz)   LMP 04/14/2022   SpO2 96%   BMI 27.97 kg/m      Cares:[id number]    Current condition: Stable room air   Neuro: A&O X 4   Cardio: WNL   Respiratory: WNL   GI/: WNL   Skin: WNL incision clean dry intact   Diet:   REG   LDA:  PIV saline locked X 1, PIV running D5 60/hr   Mobility:  UAL   Pain:None   Plan of Care: Discharge today     "

## 2022-04-23 NOTE — PLAN OF CARE
"Vitals: /80 (BP Location: Left arm)   Pulse 76   Temp 97.8  F (36.6  C) (Oral)   Resp 16   Ht 1.638 m (5' 4.5\")   Wt 75.1 kg (165 lb 8 oz)   LMP 04/14/2022   SpO2 97%   BMI 27.97 kg/m      Endocrine: n/a  Labs: n/a  Pain: Mild abdominal discomfort.   PRN's: n/a  Diet: Regular diet, fair appetite.  LDA: PIV X2 saline locked.  GI: BM 4/22, took Senna this morning.  : Voids spontaneously.  Skin:  Abdominal lap sites intact, wears binder when out of bed.  Neuro: Pleasant, alert and oriented.  Mobility: Up ad rocio.  Education: Preparation for discharge home, review orders.  Plan: Discharge home, orders reviewed and signed. Prescriptions per [facility].       Goal Outcome Evaluation:    Plan of Care Reviewed With: patient                 "

## 2022-04-23 NOTE — PLAN OF CARE
Vitals: stable room air.   Blood glucose: NA  Pain/nausea: managed with scheduled pain meds. Denies nausea.   Diet: regular, fair appetite.  brings home food.   Lines: PIV X 2 RUE infusing [id number].   : voiding well, OP greater than 1L.   GI: BM X 1 large today.   Drains: NA  Skin: incision dressing on CDI. Binder on.   Mobility: up ad rocio.   Plan : possible discharge tomorrow.

## 2022-04-24 LAB — BACTERIA UR CULT: NO GROWTH

## 2022-04-24 NOTE — OP NOTE
Transplant Surgery  Operative Note     Procedure Date:  04/21/22    Preoperative Diagnosis:  Healthy kidney donor    Postoperative Diagnosis:  Healthy kidney donor    Procedure:  1. Left Kidney Laparoscopic Hand-Assisted living donor nephrectomy for donation      Secondary Procedure:    None     Surgeon:    Surgeon(s) and Role:     * Angie Ervin MD - Primary         Fellow/Assistant:     Zachary Maria    Specimen:  Left kidney and ureter    Anesthesia:    General    Urine Output: 500 mls  Estimated Blood Loss: 50 mls   Fluids administered:      Intra Op Events: None      Complications: None.    Findings: left kidney with single artery, single vein, and single ureter       Donor UNOS ID:    [id number]  Flush Start time:  4/21/2022 10:03 AM    Arterial Clamp:    4/21/2022 10:00 AM  Arterial anatomy:  Single     Venous anatomy:    Single   Ureteral anatomy:   Single      Indication: Mahi Victor presents for Left Kidney donation. The patient has undergone a thorough medical and psychosocial evaluation and was found suitable for voluntary kidney donation. Risks and benefits of donation were discussed. The patient expressed understanding, was willing to proceed, and provided informed consent.    Final ABO/Crossmatch verification: Prior to incision, I verified the donor ABO and recipient ABO. I visually verified that the donor identification, blood type, and other vital data were compatible with the recipient.      Operative Procedure:  Mahi Victor was transported to the operating room, placed on the operating table in the right lateral decubitus position, and a universal timeout was performed. Sequential compression devices were placed on both lower extremities and general endotracheal anesthesia was induced. The patient was given IV antibiotics and Elias catheter.  The abdomen was shaved, prepped, and draped in the usual sterile fashion.    We made a 6 cm upper midline incision and carried down thru the  linea alba. The peritoneum was opened under direct vision. The hand port was put into position and a left lower quadrant 10 mm port was placed over a trocar with hand assistance. Pneumoperitoneum with CO2 was provided to 12 mmHg. General survey with the laparoscope revealed no unusual findings. An additional 10 mm port was placed in the left lateral abdomen under direct vision.     We released the left colon from its lateral attachments and rotated medially, revealing the kidney and ureter. The ureter was circumferentially dissected free distally toward the pelvis then kidney taking care to preserve its vasculature. The gonadal vein was identified and dissected enbloc with the ureter, and the proximal gonadal vein was doubly ligated and divided near the insertion into the left renal vein. The left adrenal vein was likewise identified, ligated and divided. The renal vein was then circumferentially cleared of extraneous tissue. The kidney and ureter were dissected free posteriorly from the psoas and multiple lumbar veins were clipped and divided.     We created a plane between the left adrenal gland and the upper pole of the kidney with the harmonic scalpel and the upper pole attachments were released. The anterior and inferior aspects of the renal artery at its origin were cleared of investing lymphatics. The patient was given fluid, mannitol and lasix, and the kidney was then dissected free from its lateral attachments allowing full medial rotation. The remaining lymphatics and fat around the renal artery was cleared. The patient was heparinized and the distal ureter and gonadal vein were clipped and divided. Good urine flow was seen. A laparoscopic FRED stapler was fired across the renal artery and then the renal vein.     The kidney was delivered from the hand port, flushed, and placed in cold storage. Protamine was administered for full heparin reversal. Pneumoperitoneum was reestablished and hemostasis was  obtained. Vascular transection sites were visualized and confirmed secure. The colon was placed back in its natural position. The 10 mm port sites were closed with 0-vicryl. The hand port was closed with 0-PDS. Skin incisions were irrigated and closed with 4-0 monocryl and Dermabond. Needle, sponge, and instrument counts were correct x2.  Faculty was present for key portions of the procedure. The patient tolerated the procedure well without apparent complications and was extubated and transferred to PACU in good condition.     Physician Attestation   I was present for the key portions of the procedure and I was immediately available for the entire procedure between opening and closing.    Angie Ervin MD  Date of Service (when I saw the patient): 4/21/2022  The transplant fellow, Zachary Maria MD, was present and assisted with all aspects of the operation described above from opening to closing.  There was no suitable surgical resident available to assist in this procedure.

## 2022-04-25 ENCOUNTER — TELEPHONE (OUTPATIENT)
Dept: TRANSPLANT | Facility: CLINIC | Age: 32
End: 2022-04-25
Payer: COMMERCIAL

## 2022-04-25 PROBLEM — A49.9 ESBL (EXTENDED SPECTRUM BETA-LACTAMASE) PRODUCING BACTERIA INFECTION: Status: ACTIVE | Noted: 2022-04-25

## 2022-04-25 PROBLEM — Z16.12 ESBL (EXTENDED SPECTRUM BETA-LACTAMASE) PRODUCING BACTERIA INFECTION: Status: ACTIVE | Noted: 2022-04-25

## 2022-04-25 PROBLEM — N30.00 ACUTE CYSTITIS WITHOUT HEMATURIA: Status: ACTIVE | Noted: 2022-04-25

## 2022-04-26 DIAGNOSIS — Z52.4 KIDNEY DONOR: Primary | ICD-10-CM

## 2022-04-29 ENCOUNTER — LAB (OUTPATIENT)
Dept: LAB | Facility: CLINIC | Age: 32
End: 2022-04-29

## 2022-04-29 DIAGNOSIS — Z52.4 KIDNEY DONOR: ICD-10-CM

## 2022-04-29 LAB
ALBUMIN UR-MCNC: NEGATIVE MG/DL
APPEARANCE UR: CLEAR
BILIRUB UR QL STRIP: NEGATIVE
COLOR UR AUTO: YELLOW
GLUCOSE UR STRIP-MCNC: NEGATIVE MG/DL
HGB UR QL STRIP: NEGATIVE
KETONES UR STRIP-MCNC: NEGATIVE MG/DL
LEUKOCYTE ESTERASE UR QL STRIP: NEGATIVE
MUCOUS THREADS #/AREA URNS LPF: PRESENT /LPF
NITRATE UR QL: NEGATIVE
PH UR STRIP: 5 [PH] (ref 5–7)
RBC URINE: 1 /HPF
SP GR UR STRIP: 1.02 (ref 1–1.03)
SQUAMOUS EPITHELIAL: 1 /HPF
UROBILINOGEN UR STRIP-MCNC: NORMAL MG/DL
WBC URINE: 1 /HPF

## 2022-04-29 PROCEDURE — 87086 URINE CULTURE/COLONY COUNT: CPT | Mod: 90 | Performed by: PATHOLOGY

## 2022-04-29 PROCEDURE — 81001 URINALYSIS AUTO W/SCOPE: CPT | Performed by: PATHOLOGY

## 2022-04-29 PROCEDURE — 99000 SPECIMEN HANDLING OFFICE-LAB: CPT | Performed by: PATHOLOGY

## 2022-04-30 LAB — BACTERIA UR CULT: NO GROWTH

## 2022-05-02 DIAGNOSIS — Z52.4 KIDNEY DONOR: Primary | ICD-10-CM

## 2022-05-03 ENCOUNTER — OFFICE VISIT (OUTPATIENT)
Dept: TRANSPLANT | Facility: CLINIC | Age: 32
End: 2022-05-03
Attending: SURGERY
Payer: COMMERCIAL

## 2022-05-03 ENCOUNTER — DOCUMENTATION ONLY (OUTPATIENT)
Dept: TRANSPLANT | Facility: CLINIC | Age: 32
End: 2022-05-03
Payer: COMMERCIAL

## 2022-05-03 VITALS
DIASTOLIC BLOOD PRESSURE: 87 MMHG | OXYGEN SATURATION: 98 % | SYSTOLIC BLOOD PRESSURE: 119 MMHG | WEIGHT: 158.7 LBS | HEART RATE: 75 BPM | BODY MASS INDEX: 26.82 KG/M2

## 2022-05-03 DIAGNOSIS — Z09 SURGICAL FOLLOW-UP CARE: Primary | ICD-10-CM

## 2022-05-03 DIAGNOSIS — Z52.4 KIDNEY DONOR: Primary | ICD-10-CM

## 2022-05-03 PROCEDURE — 99024 POSTOP FOLLOW-UP VISIT: CPT | Performed by: SURGERY

## 2022-05-03 ASSESSMENT — PAIN SCALES - GENERAL: PAINLEVEL: NO PAIN (0)

## 2022-05-03 NOTE — PROGRESS NOTES
Transplant Surgery Kidney Donor Progress Note     Medical record number: [id number]  YOB: 1990,   Date of Visit:  05/03/2022  For followup after kidney donation.    Assessment and Recommendations: Ms. Victor is doing well after kidney donation.     1. Lifting restrictions: 10 lbs until 8 weeks post donation.  2. Followup: 4 weeks as needed  3. Return to work to be determined per patient's progress, expected between 6-8 weeks after surgery.  4. Will continue to get UA/UCx with each lab draw to watch for UTI's.  She has made lifestyle changes.    Total time: 15 minutes  Counselling time: 10 minutes      Angie Ervin MD FACS  Assistant Professor of Surgery  Director, Living Kidney Donor Program.    ------------------------------------------------------------------------------------------    S: Ms. Victor donate a kidney 2 weeks ago and is doing well, reporting no fevers, chills, dysuria.  She is not taking narcotic analgesia.  She is not constipated.  She is mostly back to routine activities of daily living and is not feeling ready to return to work at this time.    Medications:  Prescription Medications as of 5/3/2022       Rx Number Disp Refills Start End Last Dispensed Date Next Fill Date Owning Pharmacy    acetaminophen (TYLENOL) 325 MG tablet  100 tablet 0 4/22/2022    [address]    Sig: Take 2 tablets (650 mg) by mouth every 6 hours as needed for mild pain    Class: E-Prescribe    Route: Oral    famotidine (PEPCID) 20 MG tablet  28 tablet 0 4/23/2022    [address]    Sig: Take 1 tablet (20 mg) by mouth daily    Class: E-Prescribe    Route: Oral    methocarbamol (ROBAXIN) 500 MG tablet  20 tablet 0 4/22/2022    [address]    Sig: Take 1 tablet (500 mg) by mouth 3 times daily    Class: E-Prescribe    Route: Oral     senna-docusate (SENOKOT-S/PERICOLACE) 8.6-50 MG tablet  30 tablet 0 4/22/2022    [address]    Sig: Take 1 tablet by mouth 2 times daily    Class: E-Prescribe    Route: Oral    ondansetron (ZOFRAN-ODT) 4 MG ODT tab  6 tablet 0 4/22/2022    [address]    Sig: Take 1 tablet (4 mg) by mouth every 6 hours as needed for nausea or vomiting    Class: E-Prescribe    Route: Oral    oxyCODONE (ROXICODONE) 5 MG tablet  15 tablet 0 4/22/2022    [address]    Sig: Take 1-2 tablets (5-10 mg) by mouth every 6 hours as needed for moderate to severe pain    Class: E-Prescribe    Earliest Fill Date: 4/22/2022    Route: Oral          Exam:   Pulse:  [75] 75  BP: (119)/(87) 119/87  SpO2:  [98 %] 98 %  Appearance: in no apparent distress.   Skin: normal  Head and Neck: Normal, no rashes or jaundice  Respiratory: normal respiratory excursions, no audible wheeze  Cardiovascular: RRR  Abdomen: flat, No distention and incisions C/D/I   Extremeties: Edema, none  Neuro: without deficit       Labs:   Lab on 04/29/2022   Component Date Value Ref Range Status     Color Urine 04/29/2022 Yellow  Colorless, Straw, Light Yellow, Yellow Final     Appearance Urine 04/29/2022 Clear  Clear Final     Glucose Urine 04/29/2022 Negative  Negative mg/dL Final     Bilirubin Urine 04/29/2022 Negative  Negative Final     Ketones Urine 04/29/2022 Negative  Negative mg/dL Final     Specific Gravity Urine 04/29/2022 1.019  1.003 - 1.035 Final     Blood Urine 04/29/2022 Negative  Negative Final     pH Urine 04/29/2022 5.0  5.0 - 7.0 Final     Protein Albumin Urine 04/29/2022 Negative  Negative mg/dL Final     Urobilinogen Urine 04/29/2022 Normal  Normal, 2.0 mg/dL Final     Nitrite Urine 04/29/2022 Negative  Negative Final     Leukocyte Esterase Urine 04/29/2022 Negative  Negative  Final     Mucus Urine 04/29/2022 Present (A) None Seen /LPF Final     RBC Urine 04/29/2022 1  <=2 /HPF Final     WBC Urine 04/29/2022 1  <=5 /HPF Final     Squamous Epithelials Urine 04/29/2022 1  <=1 /HPF Final     Culture 04/29/2022 No Growth   Final

## 2022-05-03 NOTE — LETTER
5/3/2022         RE: Mahi Victor  [address] [place] [address]        Dear Colleague,    Thank you for referring your patient, Mahi Victor, to the [facility]. Please see a copy of my visit note below.    Transplant Surgery Kidney Donor Progress Note     Medical record number: [id number]  YOB: 1990,   Date of Visit:  05/03/2022  For followup after kidney donation.    Assessment and Recommendations: Ms. Victor is doing well after kidney donation.     1. Lifting restrictions: 10 lbs until 8 weeks post donation.  2. Followup: 4 weeks as needed  3. Return to work to be determined per patient's progress, expected between 6-8 weeks after surgery.  4. Will continue to get UA/UCx with each lab draw to watch for UTI's.  She has made lifestyle changes.    Total time: 15 minutes  Counselling time: 10 minutes      Angie Ervin MD FACS  Assistant Professor of Surgery  Director, Living Kidney Donor Program.    ------------------------------------------------------------------------------------------    S: Ms. Victor donate a kidney 2 weeks ago and is doing well, reporting no fevers, chills, dysuria.  She is not taking narcotic analgesia.  She is not constipated.  She is mostly back to routine activities of daily living and is not feeling ready to return to work at this time.    Medications:  Prescription Medications as of 5/3/2022       Rx Number Disp Refills Start End Last Dispensed Date Next Fill Date Owning Pharmacy    acetaminophen (TYLENOL) 325 MG tablet  100 tablet 0 4/22/2022    [address]    Sig: Take 2 tablets (650 mg) by mouth every 6 hours as needed for mild pain    Class: E-Prescribe    Route: Oral    famotidine (PEPCID) 20 MG tablet  28 tablet 0 4/23/2022    [address]    Sig: Take 1 tablet (20 mg) by mouth daily    Class: E-Prescribe     Route: Oral    methocarbamol (ROBAXIN) 500 MG tablet  20 tablet 0 4/22/2022    [address]    Sig: Take 1 tablet (500 mg) by mouth 3 times daily    Class: E-Prescribe    Route: Oral    senna-docusate (SENOKOT-S/PERICOLACE) 8.6-50 MG tablet  30 tablet 0 4/22/2022    [address]    Sig: Take 1 tablet by mouth 2 times daily    Class: E-Prescribe    Route: Oral    ondansetron (ZOFRAN-ODT) 4 MG ODT tab  6 tablet 0 4/22/2022    [address]    Sig: Take 1 tablet (4 mg) by mouth every 6 hours as needed for nausea or vomiting    Class: E-Prescribe    Route: Oral    oxyCODONE (ROXICODONE) 5 MG tablet  15 tablet 0 4/22/2022    [address]    Sig: Take 1-2 tablets (5-10 mg) by mouth every 6 hours as needed for moderate to severe pain    Class: E-Prescribe    Earliest Fill Date: 4/22/2022    Route: Oral          Exam:   Pulse:  [75] 75  BP: (119)/(87) 119/87  SpO2:  [98 %] 98 %  Appearance: in no apparent distress.   Skin: normal  Head and Neck: Normal, no rashes or jaundice  Respiratory: normal respiratory excursions, no audible wheeze  Cardiovascular: RRR  Abdomen: flat, No distention and incisions C/D/I   Extremeties: Edema, none  Neuro: without deficit       Labs:   Lab on 04/29/2022   Component Date Value Ref Range Status     Color Urine 04/29/2022 Yellow  Colorless, Straw, Light Yellow, Yellow Final     Appearance Urine 04/29/2022 Clear  Clear Final     Glucose Urine 04/29/2022 Negative  Negative mg/dL Final     Bilirubin Urine 04/29/2022 Negative  Negative Final     Ketones Urine 04/29/2022 Negative  Negative mg/dL Final     Specific Gravity Urine 04/29/2022 1.019  1.003 - 1.035 Final     Blood Urine 04/29/2022 Negative  Negative Final     pH Urine 04/29/2022 5.0  5.0 - 7.0 Final      Protein Albumin Urine 04/29/2022 Negative  Negative mg/dL Final     Urobilinogen Urine 04/29/2022 Normal  Normal, 2.0 mg/dL Final     Nitrite Urine 04/29/2022 Negative  Negative Final     Leukocyte Esterase Urine 04/29/2022 Negative  Negative Final     Mucus Urine 04/29/2022 Present (A) None Seen /LPF Final     RBC Urine 04/29/2022 1  <=2 /HPF Final     WBC Urine 04/29/2022 1  <=5 /HPF Final     Squamous Epithelials Urine 04/29/2022 1  <=1 /HPF Final     Culture 04/29/2022 No Growth   Final       Again, thank you for allowing me to participate in the care of your patient.        Sincerely,        Angie Ervin MD, MD

## 2022-05-03 NOTE — PROGRESS NOTES
I visited with the patient in room on clinic 3A.  Patient here to see surgeon for 2 week post visit.  Her  Lasha accompanied her today for this visit.  Patient reports doing well.  She denies issues with pain and is having bowel movements.  She denies any issues with the wound healing and has had good appetite and activity.  She has forms for [employer] to complete for her leave.  I have filled them out and faxed to [employer].  Leave dates 4/19-22 to 6/16/22.  Patient needs 8 week recovery due to lifting at her workplace.  I answered patient's questions.  I reviewed plan for 6 week labs, patient would like to use [facility] lab.  Orders placed and additionally Dr Ervin would like urine culture on all post lab checks.   I will continue to monitor the patient's recovery progress.  Olya Daniel RN  Living Donor Coordinator  05/03/2022 10:44 AM

## 2022-05-24 ENCOUNTER — TELEPHONE (OUTPATIENT)
Dept: TRANSPLANT | Facility: CLINIC | Age: 32
End: 2022-05-24
Payer: COMMERCIAL

## 2022-05-24 NOTE — TELEPHONE ENCOUNTER
SW received call from pt stating that she has not received NKR reimbursement. SW explained that it appeared in portal that she had not filled required forms online-- SW re sent invitation to do so. Dilia states that she has filled these out in the past, but will attempt again.     Marietta López, [facility], CCTSW   Living Donor   [facility], [facility]  Direct: [phone]  E-Mail: [email or web address]

## 2022-06-02 ENCOUNTER — LAB (OUTPATIENT)
Dept: LAB | Facility: CLINIC | Age: 32
End: 2022-06-02
Payer: COMMERCIAL

## 2022-06-02 DIAGNOSIS — Z52.4 KIDNEY DONOR: ICD-10-CM

## 2022-06-02 LAB
ALBUMIN UR-MCNC: NEGATIVE MG/DL
APPEARANCE UR: CLEAR
BILIRUB UR QL STRIP: NEGATIVE
COLOR UR AUTO: NORMAL
CREAT SERPL-MCNC: 0.9 MG/DL (ref 0.52–1.04)
CREAT UR-MCNC: 35 MG/DL
CREAT UR-MCNC: 35 MG/DL
GFR SERPL CREATININE-BSD FRML MDRD: 86 ML/MIN/1.73M2
GLUCOSE UR STRIP-MCNC: NEGATIVE MG/DL
HGB UR QL STRIP: NEGATIVE
KETONES UR STRIP-MCNC: NEGATIVE MG/DL
LEUKOCYTE ESTERASE UR QL STRIP: NEGATIVE
MICROALBUMIN UR-MCNC: <5 MG/L
MICROALBUMIN/CREAT UR: NORMAL MG/G{CREAT}
NITRATE UR QL: NEGATIVE
PH UR STRIP: 5 [PH] (ref 5–7)
PROT UR-MCNC: <0.05 G/L
PROT/CREAT 24H UR: NORMAL MG/G{CREAT}
RBC URINE: 1 /HPF
SP GR UR STRIP: 1.01 (ref 1–1.03)
SQUAMOUS EPITHELIAL: <1 /HPF
UROBILINOGEN UR STRIP-MCNC: NORMAL MG/DL
WBC URINE: <1 /HPF

## 2022-06-02 PROCEDURE — 81001 URINALYSIS AUTO W/SCOPE: CPT | Performed by: PATHOLOGY

## 2022-06-02 PROCEDURE — 84156 ASSAY OF PROTEIN URINE: CPT | Performed by: PATHOLOGY

## 2022-06-02 PROCEDURE — 82565 ASSAY OF CREATININE: CPT | Performed by: PATHOLOGY

## 2022-06-02 PROCEDURE — 36415 COLL VENOUS BLD VENIPUNCTURE: CPT | Performed by: PATHOLOGY

## 2022-06-02 PROCEDURE — 99000 SPECIMEN HANDLING OFFICE-LAB: CPT | Performed by: PATHOLOGY

## 2022-06-02 PROCEDURE — 87086 URINE CULTURE/COLONY COUNT: CPT | Mod: 90 | Performed by: PATHOLOGY

## 2022-06-02 PROCEDURE — 82043 UR ALBUMIN QUANTITATIVE: CPT | Performed by: PATHOLOGY

## 2022-06-03 LAB — BACTERIA UR CULT: NO GROWTH

## 2022-06-16 ENCOUNTER — TELEPHONE (OUTPATIENT)
Dept: TRANSPLANT | Facility: CLINIC | Age: 32
End: 2022-06-16
Payer: COMMERCIAL

## 2022-06-16 DIAGNOSIS — Z52.4 KIDNEY DONOR: Primary | ICD-10-CM

## 2022-06-16 NOTE — TELEPHONE ENCOUNTER
I reviewed her lab results and answered her questions.  She reports feeling well.  She says she feels normal.  She is returning to work next week.  She will send me a form for her workplace.  Patient stated understanding and is in agreement to the POC.  Olya Daniel RN  Living Donor Coordinator  06/16/2022 4:29 PM

## 2022-10-13 DIAGNOSIS — Z52.4 KIDNEY DONOR: Primary | ICD-10-CM

## 2023-01-08 ENCOUNTER — HEALTH MAINTENANCE LETTER (OUTPATIENT)
Age: 33
End: 2023-01-08

## 2023-04-10 ENCOUNTER — DOCUMENTATION ONLY (OUTPATIENT)
Dept: TRANSPLANT | Facility: CLINIC | Age: 33
End: 2023-04-10
Payer: COMMERCIAL

## 2023-04-10 DIAGNOSIS — Z52.4 KIDNEY DONOR: Primary | ICD-10-CM

## 2023-04-21 ENCOUNTER — LAB (OUTPATIENT)
Dept: LAB | Facility: CLINIC | Age: 33
End: 2023-04-21
Payer: COMMERCIAL

## 2023-04-21 DIAGNOSIS — Z52.4 KIDNEY DONOR: ICD-10-CM

## 2023-04-21 LAB
ALBUMIN MFR UR ELPH: <6 MG/DL (ref 1–14)
ALBUMIN UR-MCNC: NEGATIVE MG/DL
APPEARANCE UR: CLEAR
BILIRUB UR QL STRIP: NEGATIVE
COLOR UR AUTO: NORMAL
CREAT SERPL-MCNC: 0.86 MG/DL (ref 0.51–0.95)
CREAT UR-MCNC: 38.1 MG/DL
CREAT UR-MCNC: 40.3 MG/DL
GFR SERPL CREATININE-BSD FRML MDRD: 90 ML/MIN/1.73M2
GLUCOSE UR STRIP-MCNC: NEGATIVE MG/DL
HGB UR QL STRIP: NEGATIVE
KETONES UR STRIP-MCNC: NEGATIVE MG/DL
LEUKOCYTE ESTERASE UR QL STRIP: NEGATIVE
MICROALBUMIN UR-MCNC: <12 MG/L
MICROALBUMIN/CREAT UR: NORMAL MG/G{CREAT}
NITRATE UR QL: NEGATIVE
PH UR STRIP: 6.5 [PH] (ref 5–7)
PROT/CREAT 24H UR: NORMAL MG/G{CREAT}
SP GR UR STRIP: 1.01 (ref 1–1.03)
UROBILINOGEN UR STRIP-MCNC: NORMAL MG/DL

## 2023-04-21 PROCEDURE — 82570 ASSAY OF URINE CREATININE: CPT | Mod: 90 | Performed by: PATHOLOGY

## 2023-04-21 PROCEDURE — 36415 COLL VENOUS BLD VENIPUNCTURE: CPT | Performed by: PATHOLOGY

## 2023-04-21 PROCEDURE — 81003 URINALYSIS AUTO W/O SCOPE: CPT | Performed by: PATHOLOGY

## 2023-04-21 PROCEDURE — 99000 SPECIMEN HANDLING OFFICE-LAB: CPT | Performed by: PATHOLOGY

## 2023-04-21 PROCEDURE — 82043 UR ALBUMIN QUANTITATIVE: CPT | Mod: 90 | Performed by: PATHOLOGY

## 2023-04-21 PROCEDURE — 84156 ASSAY OF PROTEIN URINE: CPT | Performed by: PATHOLOGY

## 2023-04-21 PROCEDURE — 82565 ASSAY OF CREATININE: CPT | Performed by: PATHOLOGY

## 2023-04-23 ENCOUNTER — HEALTH MAINTENANCE LETTER (OUTPATIENT)
Age: 33
End: 2023-04-23

## 2023-06-14 ENCOUNTER — TELEPHONE (OUTPATIENT)
Dept: TRANSPLANT | Facility: CLINIC | Age: 33
End: 2023-06-14
Payer: COMMERCIAL

## 2023-06-14 NOTE — TELEPHONE ENCOUNTER
[facility] Call Center    Phone Message    May a detailed message be left on voicemail: yes     Reason for Call: Other: Patient was kidney donor last year. Last few weeks she has been experiencing back pain, stomach pain and noticed urine turning different color. Please contact patient to advise.     Action Taken: Message routed to:  [facility] SOT    Travel Screening: Not Applicable

## 2023-06-15 NOTE — TELEPHONE ENCOUNTER
I called the patient in response to a message she left with our office.  Mahi has been experiencing back pain for a couple weeks and abdominal pains too.  She said starting Monday her urine has been a pink color.  She has a appointment today at 1pm with her PCP to investigate the issue.    She stated she does not think she has a hernia as there is no ridge or bulge of her incision area.  She thinks she is hydrating well.  But says she has been terribly constipated lately.  Denies fever or malaise.    We made plan that I will call her later today to see what the PCP examination determined.  I answered patient's questions.  Patient stated understanding and is in agreement to the plan of care.  Olya Daniel RN  Living Donor Coordinator  06/15/2023 9:03 AM

## 2023-07-19 NOTE — TELEPHONE ENCOUNTER
[facility] EMERGENCY DEPARTMENT ([facility])    7/18/23      ED PROVIDER NOTE       History     Chief Complaint   Patient presents with     Abdominal Pain     The history is provided by the patient and medical records.     Mao Nix is a 20 year old female with prior history of dysmenorrhea, prior ovarian cysts who presents to the emergency department with abdominal pain.  States that she has had diffuse, crampy, intermittent abdominal pain for the past 2 days.  She reports nausea and vomiting that is consisted of gastric contents.  Her last bowel movement was yesterday.  She denies any abdominal distention.  She has had normal flatulence.  She reports dysuria that began this morning.  Denies urinary urgency and frequency.  Her last menstrual period was approximate 1 month ago.  She denies any unusual vaginal bleeding or discharge.  Patient denies a history of abdominal surgery.    Past Medical History  Past Medical History:   Diagnosis Date     Uncomplicated asthma      Past Surgical History:   Procedure Laterality Date     NO HISTORY OF SURGERY       albuterol (PROAIR HFA/PROVENTIL HFA/VENTOLIN HFA) 108 (90 Base) MCG/ACT inhaler  docusate sodium (COLACE) 100 MG capsule  ondansetron (ZOFRAN) 4 MG tablet  Prenatal Vit-Fe Fumarate-FA (PRENATAL MULTIVITAMIN W/IRON) 27-0.8 MG tablet      No Known Allergies  Family History  No family history on file.  Social History   Social History     Tobacco Use     Smoking status: Never     Smokeless tobacco: Never   Substance Use Topics     Alcohol use: Never     Drug use: Never         A medically appropriate review of systems was performed with pertinent positives and negatives noted in the HPI, and all other systems negative.    Physical Exam   BP: 102/67  Pulse: 88  Temp: 99.1  F (37.3  C)  Resp: 16  Weight: 40.7 kg (89 lb 12.8 oz)  SpO2: 97 %  Physical Exam  Vitals and nursing note reviewed.   Constitutional:       General: She is not in acute distress.      I called the patient to check on her recovery. The donor reports doing well following discharge from the hospital.    The patient states she feels constipated as she has had no bowel movement since in the hospital.  She is urinating without difficulty.  The patient reports good pain control she is only taking tylenol.  Wound is dry and intact.  The patient reports good appetite.  The patient reports good activity level.  We reviewed plan for2 week visit.  I will plan to call the patient later this week to further check on her recovery progress.  I answered patient questions.  Olya Daniel RN  Living Donor Coordinator  04/28/2022 1:19 PM    Appearance: She is well-developed. She is not diaphoretic.   HENT:      Head: Normocephalic and atraumatic.      Mouth/Throat:      Pharynx: No oropharyngeal exudate.   Eyes:      General: No scleral icterus.     Pupils: Pupils are equal, round, and reactive to light.   Cardiovascular:      Rate and Rhythm: Normal rate and regular rhythm.      Heart sounds: Normal heart sounds.   Pulmonary:      Effort: No respiratory distress.      Breath sounds: Normal breath sounds.   Abdominal:      General: Abdomen is flat. Bowel sounds are normal.      Palpations: Abdomen is soft.      Tenderness: There is generalized abdominal tenderness. There is no guarding or rebound.   Musculoskeletal:         General: No tenderness.   Skin:     General: Skin is warm and dry.      Findings: No rash.   Neurological:      General: No focal deficit present.      Mental Status: She is alert.         ED Course, Procedures, & Data      Procedures           Results for orders placed or performed during the hospital encounter of 07/18/23   Comprehensive metabolic panel     Status: Abnormal   Result Value Ref Range    Sodium 133 (L) 136 - 145 mmol/L    Potassium 3.9 3.4 - 5.3 mmol/L    Chloride 98 98 - 107 mmol/L    Carbon Dioxide (CO2) 22 22 - 29 mmol/L    Anion Gap 13 7 - 15 mmol/L    Urea Nitrogen 9.7 6.0 - 20.0 mg/dL    Creatinine 0.47 (L) 0.51 - 0.95 mg/dL    Calcium 9.8 8.6 - 10.0 mg/dL    Glucose 88 70 - 99 mg/dL    Alkaline Phosphatase 105 (H) 35 - 104 U/L    AST 18 0 - 45 U/L    ALT 13 0 - 50 U/L    Protein Total 8.7 (H) 6.4 - 8.3 g/dL    Albumin 4.9 3.5 - 5.2 g/dL    Bilirubin Total 0.6 <=1.2 mg/dL    GFR Estimate >90 >60 mL/min/1.73m2   Lipase     Status: Normal   Result Value Ref Range    Lipase 21 13 - 60 U/L   HCG quantitative pregnancy     Status: Normal   Result Value Ref Range    hCG Quantitative <1 <5 mIU/mL   UA with Microscopic reflex to Culture     Status: Abnormal    Specimen: Urine, Clean Catch   Result Value Ref Range     Color Urine Yellow Colorless, Straw, Light Yellow, Yellow    Appearance Urine Clear Clear    Glucose Urine Negative Negative mg/dL    Bilirubin Urine Negative Negative    Ketones Urine 60 (A) Negative mg/dL    Specific Gravity Urine 1.024 1.003 - 1.035    Blood Urine Negative Negative    pH Urine 6.0 5.0 - 7.0    Protein Albumin Urine 10 (A) Negative mg/dL    Urobilinogen Urine Normal Normal, 2.0 mg/dL    Nitrite Urine Negative Negative    Leukocyte Esterase Urine Moderate (A) Negative    Bacteria Urine Few (A) None Seen /HPF    Mucus Urine Present (A) None Seen /LPF    RBC Urine 4 (H) <=2 /HPF    WBC Urine 44 (H) <=5 /HPF    Squamous Epithelials Urine 4 (H) <=1 /HPF    Transitional Epithelials Urine <1 <=1 /HPF    Narrative    Urine Culture ordered based on laboratory criteria   CBC with platelets and differential     Status: Abnormal   Result Value Ref Range    WBC Count 6.2 4.0 - 11.0 10e3/uL    RBC Count 4.82 3.80 - 5.20 10e6/uL    Hemoglobin 12.7 11.7 - 15.7 g/dL    Hematocrit 39.8 35.0 - 47.0 %    MCV 83 78 - 100 fL    MCH 26.3 (L) 26.5 - 33.0 pg    MCHC 31.9 31.5 - 36.5 g/dL    RDW 16.1 (H) 10.0 - 15.0 %    Platelet Count 288 150 - 450 10e3/uL    % Neutrophils 70 %    % Lymphocytes 21 %    % Monocytes 9 %    % Eosinophils 0 %    % Basophils 0 %    % Immature Granulocytes 0 %    NRBCs per 100 WBC 0 <1 /100    Absolute Neutrophils 4.3 1.6 - 8.3 10e3/uL    Absolute Lymphocytes 1.3 0.8 - 5.3 10e3/uL    Absolute Monocytes 0.5 0.0 - 1.3 10e3/uL    Absolute Eosinophils 0.0 0.0 - 0.7 10e3/uL    Absolute Basophils 0.0 0.0 - 0.2 10e3/uL    Absolute Immature Granulocytes 0.0 <=0.4 10e3/uL    Absolute NRBCs 0.0 10e3/uL   [place] Draw     Status: None    Narrative    The following orders were created for panel order [place] Draw.  Procedure                               Abnormality         Status                     ---------                               -----------         ------                     Extra Blue Top  Tube[[id number]]                              Final result               Extra Red Top Tube[[id number]]                               Final result                 Please view results for these tests on the individual orders.   Extra Blue Top Tube     Status: None   Result Value Ref Range    Hold Specimen JIC    Extra Red Top Tube     Status: None   Result Value Ref Range    Hold Specimen JIC    CBC with platelets differential     Status: Abnormal    Narrative    The following orders were created for panel order CBC with platelets differential.  Procedure                               Abnormality         Status                     ---------                               -----------         ------                     CBC with platelets and d...[[id number]]  Abnormal            Final result                 Please view results for these tests on the individual orders.     Medications   cefTRIAXone (ROCEPHIN) 1 g vial to attach to  mL bag for ADULTS or NS 50 mL bag for PEDS (has no administration in time range)   0.9% sodium chloride BOLUS (0 mLs Intravenous Stopped 7/18/23 2200)   ondansetron (ZOFRAN) injection 4 mg (4 mg Intravenous $Given 7/18/23 2110)     Labs Ordered and Resulted from Time of ED Arrival to Time of ED Departure   COMPREHENSIVE METABOLIC PANEL - Abnormal       Result Value    Sodium 133 (*)     Potassium 3.9      Chloride 98      Carbon Dioxide (CO2) 22      Anion Gap 13      Urea Nitrogen 9.7      Creatinine 0.47 (*)     Calcium 9.8      Glucose 88      Alkaline Phosphatase 105 (*)     AST 18      ALT 13      Protein Total 8.7 (*)     Albumin 4.9      Bilirubin Total 0.6      GFR Estimate >90     ROUTINE UA WITH MICROSCOPIC REFLEX TO CULTURE - Abnormal    Color Urine Yellow      Appearance Urine Clear      Glucose Urine Negative      Bilirubin Urine Negative      Ketones Urine 60 (*)     Specific Gravity Urine 1.024      Blood Urine Negative      pH Urine 6.0      Protein Albumin Urine 10 (*)      Urobilinogen Urine Normal      Nitrite Urine Negative      Leukocyte Esterase Urine Moderate (*)     Bacteria Urine Few (*)     Mucus Urine Present (*)     RBC Urine 4 (*)     WBC Urine 44 (*)     Squamous Epithelials Urine 4 (*)     Transitional Epithelials Urine <1     CBC WITH PLATELETS AND DIFFERENTIAL - Abnormal    WBC Count 6.2      RBC Count 4.82      Hemoglobin 12.7      Hematocrit 39.8      MCV 83      MCH 26.3 (*)     MCHC 31.9      RDW 16.1 (*)     Platelet Count 288      % Neutrophils 70      % Lymphocytes 21      % Monocytes 9      % Eosinophils 0      % Basophils 0      % Immature Granulocytes 0      NRBCs per 100 WBC 0      Absolute Neutrophils 4.3      Absolute Lymphocytes 1.3      Absolute Monocytes 0.5      Absolute Eosinophils 0.0      Absolute Basophils 0.0      Absolute Immature Granulocytes 0.0      Absolute NRBCs 0.0     LIPASE - Normal    Lipase 21     HCG QUANTITATIVE PREGNANCY - Normal    hCG Quantitative <1     URINE CULTURE     No orders to display      12:14 AM Feeling better.  Abdomen soft and nontender.    Critical care was not performed.     Medical Decision Making  The patient's presentation was of moderate complexity (an undiagnosed new problem with uncertain diagnosis).    The patient's evaluation involved:  ordering and/or review of 3+ test(s) in this encounter (see separate area of note for details)    The patient's management necessitated moderate risk (prescription drug management including medications given in the ED).      Assessment & Plan    20 year old female to emergency department with 2 days of crampy abdominal pain with nausea and vomiting.  She reports dysuria this morning.  Patient has normal vital signs.  She has diffuse tenderness on exam but no guarding.  Diagnostic evaluation includes normal CBC, comprehensive metabolic panel, and lipase.  As such, do not suspect acute liver pathology including biliary obstruction or acute pancreatitis.  The patient's urinalysis  has pyuria and bacteriuria.  With dysuria, suspect urinary tract infection.  Patient given IV fluids and ondansetron with resolution of her nausea and vomiting.  The patient's repeat abdominal examination is normal.  Patient given 1 g of ceftriaxone for urinary tract infection.  Will discharge to home on 7-day course of cefdinir.  Ondansetron prescribed for nausea.  Urine culture pending.  Primary care follow-up recommended.  Return precautions provided.    I have reviewed the nursing notes. I have reviewed the findings, diagnosis, plan and need for follow up with the patient.    New Prescriptions    No medications on file       Final diagnoses:   Acute cystitis with hematuria   Nausea and vomiting, unspecified vomiting type     Chart documentation was completed with Dragon voice-recognition software. Even though reviewed, this chart may still contain some grammatical, spelling, and word errors.     Nirmal Hernandez MD   [facility] EMERGENCY DEPARTMENT  7/18/2023     Nirmal Hernandez MD  07/19/23 0017

## 2023-10-26 DIAGNOSIS — Z52.4 KIDNEY DONOR: Primary | ICD-10-CM

## 2024-05-09 DIAGNOSIS — Z52.4 KIDNEY DONOR: Primary | ICD-10-CM

## 2024-06-29 ENCOUNTER — HEALTH MAINTENANCE LETTER (OUTPATIENT)
Age: 34
End: 2024-06-29

## 2024-07-09 NOTE — LETTER
2021         RE: Mahi Victor  [address]  [place] [address]        Dear Colleague,    Thank you for referring your patient, Mahi Victro, to the [facility]. Please see a copy of my visit note below.    Chief Complaint   Patient presents with     Iohexal test     Donor Iohexol test    Mahi Victor presents today to [facility] for a Donor Iohexol test.      Progress note:  ID verified by name and .     The following information was verified with the patient:  Female Patients is there any possibility of being pregnant No  Is there a history of allergy (skin rash, swelling, ect) to:   A.  Iodine (except skin reactions to betadine): No   B. Intravenous radio-contrast agents: No   C. Seafood No     present during visit today: Not Applicable.    R.N. provided patient with educational handout regarding timed test. Yes    24 gauge PIV placed in right AC and Iohexol administered over 2 minutes.  Positive blood return verified before and after injection. PIV removed.  20 gauge PIV placed in left AC (by lab)  for blood draws and CT this afternoon.    Medication administered:  Iohexol (Omnipaque 300mg iodine/ml concentration) 5 mls.    Start time: 817  Stop time: 819    Drug Waste Record    Drug Name: OmniPaque (iohexal)  Dose: 5 ml  Route administered: IV  NDC #: 0407-1413-10  Amount of waste(mL): 5 ml  Reason for waste: Single use vial      Evaluation nurse in transplant to draw labs at 2 and 4 hours post iohexol administration.  Patient given a slip with the times to get labs drawn and verbalized understanding of the plan.    Patient tolerated the procedure:  Yes    After the infusion patient was discharged to the next appointment.    Administrations This Visit     iohexol (OMNIPAQUE 300) injection 5 mL     Admin Date  2021 Action  Given Dose  5 mL Route  Intravenous Administered By  Era Fraser RN              Vital signs:  Temp: 98.4  F  "(36.9  C) Temp src: Oral BP: 102/67 Pulse: 71   Resp: 17 SpO2: 98 %     Height: 165.1 cm (5' 5\") Weight: 73.6 kg (162 lb 4.8 oz)  Estimated body mass index is 27.01 kg/m  as calculated from the following:    Height as of this encounter: 1.651 m (5' 5\").    Weight as of this encounter: 73.6 kg (162 lb 4.8 oz).                  Again, thank you for allowing me to participate in the care of your patient.        Sincerely,        [facility]    " no

## 2025-04-20 ENCOUNTER — HEALTH MAINTENANCE LETTER (OUTPATIENT)
Age: 35
End: 2025-04-20

## (undated) DEVICE — ENDO TROCAR FIRST ENTRY KII FIOS Z-THRD 12X100MM CTF73

## (undated) DEVICE — GLOVE PROTEXIS BLUE W/NEU-THERA 6.5  2D73EB65

## (undated) DEVICE — SU PROLENE 1 CTX 30" 8455H

## (undated) DEVICE — SOL ADH LIQUID BENZOIN SWAB 0.6ML C1544

## (undated) DEVICE — PREP CHLORAPREP 26ML TINTED HI-LITE ORANGE 930815

## (undated) DEVICE — SU SILK 4-0 TIE 12X30" A303H

## (undated) DEVICE — CLIP APPLIER ENDO ROTATING 10MM MED/LG ER320

## (undated) DEVICE — Device

## (undated) DEVICE — CANNULA VESSEL DLP  30001

## (undated) DEVICE — ESU ENDO SCISSORS 5MM CVD 5DCS

## (undated) DEVICE — BASIN SET SINGLE STERILE 13752-624

## (undated) DEVICE — DRAPE ISOLATION BAG 1003

## (undated) DEVICE — SU VICRYL 0 TIE 54" J608H

## (undated) DEVICE — DRSG PRIMAPORE 03 1/8X6" 66000318

## (undated) DEVICE — STPL POWERED ECHELON VASC 35MM PVE35A

## (undated) DEVICE — WIPES FOLEY CARE SURESTEP PROVON DFC100

## (undated) DEVICE — CATH TRAY FOLEY SURESTEP 16FR W/URNE MTR STLK LATEX A303316A

## (undated) DEVICE — SHEARS HARMONIC ULTRASONIC LAP 36CM CURVE TIP HAR1136

## (undated) DEVICE — SU VICRYL 3-0 SH 27" UND J416H

## (undated) DEVICE — DEVICE SUTURE PASSER 14GA WECK EFX EFXSP2

## (undated) DEVICE — STRAP UNIVERSAL POSITIONING 2-PIECE 4X47X76" 91-287

## (undated) DEVICE — LINEN TOWEL PACK X6 WHITE 5487

## (undated) DEVICE — DRSG PRIMAPORE 02X3" 7133

## (undated) DEVICE — TUBING C02 INSUFFLATION LAP FILTER HEATER 6198

## (undated) DEVICE — ENDO TROCAR SLEEVE KII Z-THREADED 12X100MM CTS22

## (undated) DEVICE — DRSG STERI STRIP 1/2X4" R1547

## (undated) DEVICE — LINEN TOWEL PACK X30 5481

## (undated) DEVICE — DRAPE IOBAN INCISE 23X17" 6650EZ

## (undated) DEVICE — TUBING IRRIG CYSTO/BLADDER SET 81" LF 2C4040

## (undated) DEVICE — GOWN IMPERVIOUS BREATHABLE SMART LG 89015

## (undated) DEVICE — DRAPE SLUSH/WARMER 66X44" ORS-320

## (undated) DEVICE — SPONGE LAP 12X12" X8425

## (undated) DEVICE — SU MONOCRYL 4-0 PS-2 27" UND Y426H

## (undated) DEVICE — ANTIFOG SOLUTION W/FOAM PAD 31142527

## (undated) DEVICE — SUCTION MANIFOLD NEPTUNE 2 SYS 4 PORT 0702-020-000

## (undated) DEVICE — SOL WATER IRRIG 1000ML BOTTLE 2F7114

## (undated) DEVICE — SU SILK 2-0 TIE 12X30" A305H

## (undated) DEVICE — SOL NACL 0.9% IRRIG 1000ML BOTTLE 2F7124

## (undated) DEVICE — SU SILK 3-0 TIE 12X30" A304H

## (undated) DEVICE — ESU PENCIL SMOKE EVAC W/ROCKER SWITCH 0703-047-000

## (undated) RX ORDER — PROPOFOL 10 MG/ML
INJECTION, EMULSION INTRAVENOUS
Status: DISPENSED
Start: 2022-04-21

## (undated) RX ORDER — FENTANYL CITRATE 50 UG/ML
INJECTION, SOLUTION INTRAMUSCULAR; INTRAVENOUS
Status: DISPENSED
Start: 2022-04-21

## (undated) RX ORDER — ROCURONIUM BROMIDE 50 MG/5 ML
SYRINGE (ML) INTRAVENOUS
Status: DISPENSED
Start: 2022-04-21

## (undated) RX ORDER — KETOROLAC TROMETHAMINE 30 MG/ML
INJECTION, SOLUTION INTRAMUSCULAR; INTRAVENOUS
Status: DISPENSED
Start: 2022-04-21

## (undated) RX ORDER — ONDANSETRON 2 MG/ML
INJECTION INTRAMUSCULAR; INTRAVENOUS
Status: DISPENSED
Start: 2022-04-21

## (undated) RX ORDER — HEPARIN SODIUM 1000 [USP'U]/ML
INJECTION, SOLUTION INTRAVENOUS; SUBCUTANEOUS
Status: DISPENSED
Start: 2022-04-21

## (undated) RX ORDER — CARDIOPLEG/ORGAN PRESERV NO.1 9-198-2-1
BOTTLE PERFUSION
Status: DISPENSED
Start: 2022-04-21

## (undated) RX ORDER — EPHEDRINE SULFATE 50 MG/ML
INJECTION, SOLUTION INTRAMUSCULAR; INTRAVENOUS; SUBCUTANEOUS
Status: DISPENSED
Start: 2022-04-21

## (undated) RX ORDER — ACETAMINOPHEN 500 MG
TABLET ORAL
Status: DISPENSED
Start: 2022-04-21

## (undated) RX ORDER — PIPERACILLIN SODIUM, TAZOBACTAM SODIUM 3; .375 G/15ML; G/15ML
INJECTION, POWDER, LYOPHILIZED, FOR SOLUTION INTRAVENOUS
Status: DISPENSED
Start: 2022-04-21

## (undated) RX ORDER — HYDROMORPHONE HYDROCHLORIDE 1 MG/ML
INJECTION, SOLUTION INTRAMUSCULAR; INTRAVENOUS; SUBCUTANEOUS
Status: DISPENSED
Start: 2022-04-21

## (undated) RX ORDER — DEXAMETHASONE SODIUM PHOSPHATE 4 MG/ML
INJECTION, SOLUTION INTRA-ARTICULAR; INTRALESIONAL; INTRAMUSCULAR; INTRAVENOUS; SOFT TISSUE
Status: DISPENSED
Start: 2022-04-21

## (undated) RX ORDER — HYDROMORPHONE HCL IN WATER/PF 6 MG/30 ML
PATIENT CONTROLLED ANALGESIA SYRINGE INTRAVENOUS
Status: DISPENSED
Start: 2022-04-21

## (undated) RX ORDER — ATROPINE SULFATE 0.4 MG/ML
AMPUL (ML) INJECTION
Status: DISPENSED
Start: 2022-04-21

## (undated) RX ORDER — SODIUM CHLORIDE, SODIUM LACTATE, POTASSIUM CHLORIDE, CALCIUM CHLORIDE 600; 310; 30; 20 MG/100ML; MG/100ML; MG/100ML; MG/100ML
INJECTION, SOLUTION INTRAVENOUS
Status: DISPENSED
Start: 2022-04-21

## (undated) RX ORDER — FUROSEMIDE 10 MG/ML
INJECTION INTRAMUSCULAR; INTRAVENOUS
Status: DISPENSED
Start: 2022-04-21

## (undated) RX ORDER — LIDOCAINE HYDROCHLORIDE 20 MG/ML
INJECTION, SOLUTION EPIDURAL; INFILTRATION; INTRACAUDAL; PERINEURAL
Status: DISPENSED
Start: 2022-04-21

## (undated) RX ORDER — BUPIVACAINE HYDROCHLORIDE 2.5 MG/ML
INJECTION, SOLUTION EPIDURAL; INFILTRATION; INTRACAUDAL
Status: DISPENSED
Start: 2022-04-21

## (undated) RX ORDER — GABAPENTIN 300 MG/1
CAPSULE ORAL
Status: DISPENSED
Start: 2022-04-21

## (undated) RX ORDER — ERTAPENEM 1 G/1
INJECTION, POWDER, LYOPHILIZED, FOR SOLUTION INTRAMUSCULAR; INTRAVENOUS
Status: DISPENSED
Start: 2022-04-21

## (undated) RX ORDER — DEXTROSE, SODIUM CHLORIDE, SODIUM LACTATE, POTASSIUM CHLORIDE, AND CALCIUM CHLORIDE 5; .6; .31; .03; .02 G/100ML; G/100ML; G/100ML; G/100ML; G/100ML
INJECTION, SOLUTION INTRAVENOUS
Status: DISPENSED
Start: 2022-04-21

## (undated) RX ORDER — FENTANYL CITRATE-0.9 % NACL/PF 10 MCG/ML
PLASTIC BAG, INJECTION (ML) INTRAVENOUS
Status: DISPENSED
Start: 2022-04-21